# Patient Record
Sex: FEMALE | Race: WHITE | NOT HISPANIC OR LATINO | Employment: FULL TIME | URBAN - METROPOLITAN AREA
[De-identification: names, ages, dates, MRNs, and addresses within clinical notes are randomized per-mention and may not be internally consistent; named-entity substitution may affect disease eponyms.]

---

## 2023-05-12 ENCOUNTER — TELEPHONE (OUTPATIENT)
Dept: PLASTIC SURGERY | Facility: CLINIC | Age: 28
End: 2023-05-12
Payer: COMMERCIAL

## 2023-05-12 DIAGNOSIS — F64.9 GENDER DYSPHORIA: Primary | ICD-10-CM

## 2023-05-12 NOTE — TELEPHONE ENCOUNTER
Patient requesting a vaginoplasty revision.  Patient reports she had original vaginoplasty in 2020, with Dr. Marlin Castellanos.    Registered patient with chart in [facility].    Patient reports she was referred to Dr. Chowdary through her job.    Patient would like revision of vaginoplasty for removal of excess skin and to fix skin shelf at introitus.  Patient denies issues with dilating, but reports intercourse is not possible.  Patient reports she is interested in vaginal penetration.    Patient reports she has already tried pelvic PT.  She did it for 4 months and last had an appointment in March 2023.  Patient reports she saw no improvement with pelvic PT so she stopped going.    Patient reports she has been on HRT since 2016; living in gender role congruent with gender identity since 2016; out to family and friends since 2016; and height of 5’11 and weight of 180 lbs (BMI 25.1).    Notified patient that 2 mental health letters of readiness required before consult can be scheduled.  Patient reports they have a therapist and a psychiatrist who can each provide a CHRIS.  Emailed patient sample CHRIS per their request.  Asked patient to email me their 2 completed CHRSI when available.  Patient agreeable to plan.    Emailed patient new patient paperwork required to schedule consult.    Also emailed patient a flyer for the [facility] Gender Care Program.  Asked patient to outreach me if they need assistance getting connected to any of our gender-affirming resources.

## 2023-06-01 NOTE — TELEPHONE ENCOUNTER
Patient reports she faxed Dr. Castellanos's office the completed records release form so we should hopefully receive patient's medical records soon.

## 2023-06-01 NOTE — TELEPHONE ENCOUNTER
Received patient’s new patient paperwork and 2 CHRIS for vaginoplasty revision surgery, with Dr. Shakila Chowdary.  Spoke with patient and scheduled them for next available consult with Dr. Chowdary.  Patient satisfied.  Sent patient email with the office address for Dr. Chowdary, as well as their appointment date and time.  Also emailed patient records release form to request medical records from the office of Dr. Marlin Castellanos.

## 2023-06-28 NOTE — TELEPHONE ENCOUNTER
Have yet to receive patient's medical records from Dr. Castellanos's office.  Attempted to call office of Dr. Castellanos to request message -- left office detailed voice message requesting medical records.  Patient aware that Dr. Chowdary requires medical records from previous surgeon to move forward with revision.  Patient stated she would like to cancel consult until we receive her medical records.  Appointment canceled until we can obtain her records.

## 2023-08-11 RX ORDER — ATOMOXETINE 10 MG/1
60 CAPSULE ORAL DAILY
COMMUNITY

## 2023-08-11 NOTE — TELEPHONE ENCOUNTER
Received operative note from Dr. Castellanos's office.  Outreached patient and scheduled her for vaginoplasty revision consult.    Placed order for pelvic PT and advised patient to schedule appointment.  Patient agreeable.    Below is a copy of patient's upcoming consult note with Dr. Chowdary that has been pre-filled with patient history.  This is just in case patient's appointment is rescheduled or canceled, which could lead to the information being erased.        RECONSTRUCTIVE PLASTIC SURGERY  -  NEW PATIENT NOTE    CC: Evaluation for vaginoplasty revision    HPI: Vera Cote is a 28 y.o. transfeminine patient (assigned male at birth) who presents to discuss a vaginoplasty revision.  PMH significant for ADHD.    Patient's original vaginoplasty was performed on 10.21.2020 by Dr. Marlin Castellanos.  Patient would like revision of vaginoplasty for removal of excess skin and to fix skin shelf at introitus.  Patient denies issues with dilating, but reports intercourse is not possible.  Patient reports she is interested in vaginal penetration, however.  Patient reports she has already tried pelvic PT.  She did it for 4 months and last had an appointment in March 2023.  Patient reports she saw no improvement with pelvic PT so she stopped going.    Patient referred to [facility] pelvic PT.    Patient has been on HRT since 2016 and out socially since 2016.        Social History  Currently is currently working. Works as an  for gender affirming surgeries.  Lives with fiancee  Tobacco use: non-smoker  THC:  Yes, 1-2x/month via vape        ASSESSMENT / PLAN:     Vera Cote is a very pleasant 28 y.o. transfeminine patient (assigned male at birth) referred for vaginoplasty revision.

## 2023-08-11 NOTE — PROGRESS NOTES
"RECONSTRUCTIVE PLASTIC SURGERY  -  NEW PATIENT NOTE    CC: Evaluation for vaginoplasty revision    HPI: Vera Cote is a 28 y.o. transfeminine patient (assigned male at birth) who presents to discuss a vaginoplasty revision.  PMH significant for ADHD.      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 She had wound dehiscence early on and then required debridement of the labia. She was told that there would be a second stage for labiaplasty with her results.     2-3 months after surgery had bleeding with dilation and dilation become more difficult. Became painful and would take 1-2 hours to advance the dilator as far as possible. She lost depth and width after that, but was able to keep dilating some. She now dilates a couple of times a week. She reports being able to advance the dilator to the 3rd dot on the blue soul source dilator or to the 1st dot on the green dilator. She does not have pain with this. She states she is unable to have penetrative intercourse. It feels like the phallus is being blocked from further insertion, like it is \"hitting a wall.\" She has done pelvic PT in the past for approximately 4 months.     Clitoris sensation is good with orgasm   No urinary complications but unhappy with the aesethtics of the labia-urethral unit. States that it appears very pink and \"angry.\" She has had partners comment on the appearance in a negative way.       Patient has been on HRT since 2016 and out socially since 2016.        Past Medical History:   Diagnosis Date    ADHD     Wears glasses        Past Surgical History:   Procedure Laterality Date    VAGINOPLASTY  2020   Right lobectomy as child      Current Outpatient Medications:     atomoxetine (STRATTERA) 10 mg capsule, Take 10 mg by mouth 2 (two) times a day., Disp: , Rfl:     ESTRADIOL TRANSDERMAL PATCH TD, Place on the skin once a week. 3 times a week, Disp: , Rfl:      No Known Allergies    Social " History  Currently is currently working. Works as an  for gender affirming surgeries.  Lives with [facility]  Tobacco use: non-smoker  THC:  Yes, 1-2x/month via vape    Family History  History reviewed. No pertinent family history.       ROS: 10/14 systems reviewed, negative except noted above.       PHYSICAL EXAM:  There were no vitals filed for this visit.   Patient with height of 511 and weight of 180 lbs (BMI 25.1).      In general, the patient is well-appearing, nicely groomed, and in no acute distress. They are able to provide their own medical history.  Examination of the vulva reveals shortened left labia majora with fullness superiorly and loss of labia inferiorly. There is a small dog ear at the inferior end of the labia. The clitoris is midline and sensate. Urethra is midline. There is scattered pink granulation tissue along the uretha. Labia minora are effaced and flattened bilaterally. She has a prominent bulbospongiosus muscle at the base of the urethra. Introitus is supple. Speculum examination reveals narrowing of the vaginal canal after approx 2-3 inches proximal.         LABS/STUDIES: none pertinent        ASSESSMENT / PLAN:     Vera Cote is a very pleasant 28 y.o. transfeminine patient (assigned male at birth) referred for vaginoplasty revision. Discussed the different areas of concern and discussed revision for these areas. In regards to her labia, she has a loss of tissue at the inferior aspect of the labia. She would require adjacent tissue transfer, possible FTSG, to the area to reconstruct the inferior labia majora. In regards to the labia minora, she has effacement with little projection. Discussed secondary labiaplasty with tissue advancement and grafting. This will likely help some of the granulation tissue on the urethra as well, as this would prevent the urethra from being over exposed. In regards to her vaginal introitus, discussed trying pelvic PT to soften  scar and relax pelvic floor muscles. Her inability for penetration, but ability to dilate, may be due to the excess bulbospongiosus muscle around the urethra. When she is aroused, this likely becomes engorged and protrudes into the vaginal canal/introitus, making penetration difficult. She is not aroused with dilation, so she is able to advance the dilator further. Discussed removal of the excess tissue. We also discussed peritoneal pulldown revision if more depth is needed. We discussed staging procedures - would recommend removal of the bulbospongiosus muscle and labiaplasty. The removal of the excess muscle may be all she needs to allow for penetrative intercourse, as her canal does have a few inches of depth. If this does not address her penetration issues and more depth is needed, we can do a peritoneal pulldown to provide her more depth.     She will start with pelvic PT and we will re-engage afterward to further discuss surgery.             Photographs were taken in clinic today for surgical planning and continuity of care.       Thank you for the kind referral of this very pleasant patient. Please do not hesitate to contact me with any questions or concerns about my aspect of their care.       Shakila Chowdary MD

## 2023-09-22 ENCOUNTER — OFFICE VISIT (OUTPATIENT)
Dept: PLASTIC SURGERY | Facility: CLINIC | Age: 28
End: 2023-09-22
Payer: COMMERCIAL

## 2023-09-22 VITALS
SYSTOLIC BLOOD PRESSURE: 112 MMHG | HEIGHT: 71 IN | HEART RATE: 89 BPM | OXYGEN SATURATION: 99 % | BODY MASS INDEX: 27.86 KG/M2 | RESPIRATION RATE: 16 BRPM | WEIGHT: 199 LBS | DIASTOLIC BLOOD PRESSURE: 78 MMHG | TEMPERATURE: 98 F

## 2023-09-22 DIAGNOSIS — F64.9 GENDER DYSPHORIA: Primary | ICD-10-CM

## 2023-09-22 PROCEDURE — 3008F BODY MASS INDEX DOCD: CPT | Performed by: PLASTIC SURGERY

## 2023-09-22 PROCEDURE — 99203 OFFICE O/P NEW LOW 30 MIN: CPT | Performed by: PLASTIC SURGERY

## 2023-09-22 RX ORDER — TESTOSTERONE GEL, 1% 10 MG/G
GEL TRANSDERMAL
COMMUNITY
Start: 2022-09-28 | End: 2024-05-14 | Stop reason: ENTERED-IN-ERROR

## 2023-10-18 ENCOUNTER — HOSPITAL ENCOUNTER (OUTPATIENT)
Dept: PHYSICAL THERAPY | Facility: HOSPITAL | Age: 28
Setting detail: THERAPIES SERIES
Discharge: HOME | End: 2023-10-18
Attending: PLASTIC SURGERY
Payer: COMMERCIAL

## 2023-10-18 DIAGNOSIS — F64.9 GENDER DYSPHORIA: ICD-10-CM

## 2023-10-18 PROCEDURE — 97162 PT EVAL MOD COMPLEX 30 MIN: CPT | Mod: GP

## 2023-10-18 NOTE — OP PT TREATMENT LOG
"Vera is a 28 y.o F with c/o PFD. Hx vaginoplasty revision 2020 and surgeon left behind more BC tissue which limits penetration with sexual activity due to feeling \"wall\" sensation as well as increased pain. Noted urethra aims in different direction and may plan for revision surgery in the future. Physician instructed patient to trial PFPT first. Denies bowel or bladder issues or pelvic pain.        Bladder: Comment:         Urination frequency Every 2 hours   Urgency: denies   Incontinence denies   Pads denies   Nocturia denies   Pain denies   Emptying denies   Prolapse symptoms denies   Liquid consumption Water, coffee   UTI history denies     Bowel: Comment:         Frequency daily   Urge denies   Incontinence denies   Emptying denies   Pain denies   [place] stool Type 3 and 4   Fiber n/a   Management diet            Sexual Activity Comment:         Type PIV   Pain Yes with further insertion    Orgasm yes                 Pain Comment:         Mid spine pain 2/10; due to rib dysfunction                                     Other Comment:         Physical activity Biking, walking    PLOF 2020   Living environment Fiance  Surgical coordination at [place]    Other n/a                          Systems Review:  Cord questions:  Pins and needles or tingling in both arms and both legs at the same time? denies  Problems with stumbling or falling? Denies     Cauda equina questions:  Problems with bowel and bladder control? Specifically retention? See above  Pins and needles or numbness in the saddle area? Denies     Review of systems:  General - (chills, night sweats, recent infection, fever, weight loss/gain, unexplained night pain, excessive fatigue): Denies  Do you have a history of cancer? Denies  Gastrointestinal system - (abdominal pain, bowel changes, nausea, vomiting, bloating): Denies  Cardiovascular system - (chest pain, palpitations, orthopnea, other): Denies  Respiratory system - (cough, SOB, sputum production, " other): Denies  Musculoskeletal system: osteoporosis, vertebral fracture? Denies  Endocrine - (polyuria, polydipsia, heat or cold intolerance, other): Denies  Neurological - (numbness/tingling, falling/stumbling, HA, dizziness, diplopia, dysphagia/dysarthria, double vision, tinnitus, memory, drop attacks, other): Denies  Any long-term steroid use? Denies        Patient goals prep     OBJECTIVE FINDINGS:       ASSESSMENT   PELVIS/POSTURE                 LUMBAR AROM     Flexion  50%   Extension  100%   rotation  100%   Sidebending  100%         HIP ROM     ER  WFL   IR  WFL   Flexion  WFL   Extension  NT   Knee ROM WFL   Ankle ROM  WFL   Lower Extremity Strength     Hip flexion  5/5   Hip extension  NT   Hip IR  5/5   Hip ER  5/5   Hip abduction  5/5   Hip adduction 5/5   Knee Flexion  5/5   Knee Extension 5/5   Ankle DF  5/5               SPECIAL TESTS     Hamstring length  neg   Hip FADIR  neg   Hip SCOUR  neg   Trendelenburg     Slump test     SLR test     JUSTIN  neg               LUMBAR PALPATION     HIP PALPATION     OTHER PALPATION           SI JOINT TESTING     S/L Compression test     Supine Gapping test    Thigh thrust test     Prone sacral thrust test     Gaenslen test           ABDOMEN     Palpation  no TTP or restriction   Hip flexors Mild tension B   Diaphragm no TTP or restriction   Breathing pattern  belly   Diastasis  NT   Skin/ integumentary  intact         BALANCE     GAIT MECHANICS     RUNNING MECHANICS     SQUATTING MECHANICS     LUNGING MECHANICS     BED MOBILITY     SIT TO STAND     FLOOR TO STAND        Verbal consent give for internal evaluation and treatment. YES       ASSESSMENT   EXTERNAL genital EXAM     PFM OBSERVATION     Skin integrity  intact; redness noted over labia   contraction  present   relaxation  normal   Bulge/pelvic drop  present   Cough  normal   Light touch sensation  intact   INTERNAL PFM exam Internal vaginal   Levator Ani Strength  *deferred due to pelvic tension   Power     Endurance     Repetitions     Quick Flicks                 Palpation  mild tension first later  Mild to moderate tension deep layer  May be due to scarring  No pain              TAILBONE               TREATMENT LOG:  verbal consent for internal intervention: YES  Diagnosis   PFD     Precautions          PT INITIAL EVALUATION:    10/18/23      PT PROGRESS NOTE:           Y/N Treatment Details: Time:   MODALITIES  41717     0 mins   Heat         Ice         THER ACT          52267     0 mins (billed with eval)   OUTCOME MEASURES Y PFDI: 55/300 = 18.3%     Falls Screen, Medication Review, VS, pain assessment Y       Pt Education:verbalized understanding of education and returned demonstration appropriately Y Pelvic floor anatomy/ purpose function, POC, relationship between PFM/diaphragm     HEP / HEP Review Y  eval: happy baby, butterfly, child's pose     THER EX         30055     0 mins                                                     NEURO RE-ED    51844     0 mins                                 MANUAL                 58434     0 mins   Joint Mobilization          Soft Tissue mobilizations External         Soft Tissue Mobilization Internal         IASTM

## 2023-10-18 NOTE — PROGRESS NOTES
Physical Therapy Evaluation    [facility] Fax: [phone]    PT EVALUATION FOR OUTPATIENT THERAPY    Patient: Vera Cote    MRN: [id number]  : 1995 28 y.o.     Referring Physician: Shakila Chowdary MD  Date of Visit: 10/18/2023      Certification Dates:  10/18/23 through 24         Recommended Frequency & Duration:  1 time/week for up to 3 months     Diagnosis:   1. Gender dysphoria        Chief Complaints:   Chief Complaint   Patient presents with    PFD       Precautions: no known precautions/restrictions  Precautions additional comments:      Past Medical History:   Past Medical History:   Diagnosis Date    ADHD     Wears glasses        Past Surgical History:   Past Surgical History:   Procedure Laterality Date    VAGINOPLASTY           LEARNING ASSESSMENT    Assessment completed:  Yes    Learner name:  Vera    Learner: Patient    Learning Barriers:  Learning barriers: No Barriers    Preferred Language: English     Needed: No    Education Provided:   Method: Discussion, Handout and Demonstration  Readiness: acceptance and eager  Response: Demonstrated understanding, Needs reinforcement and Verbalizes understanding      CO-LEARNER ASSESSMENT:    Completed: No          Welcome letter discussed: Yes Patient provided with Welcome Letter, which includes attendance policy. Provided education regarding cancellation and no-show policy. Education regarding the importance of participation and regular attendance to maximize goal attainment.         OBJECTIVE MEASUREMENTS/DATA:    Time In Session:  Start Time: 1100  Stop Time: 1200  Time Calculation (min): 60 min   Assessment and Plan - 10/18/23 1446        Assessment    Plan of Care reviewed and patient/family in agreement Yes     System Pathology/Pathophysiology Noted genitourinary     Functional Limitations in Following Categories (PT Eval) community/leisure;home management;work     Rehab Potential/Prognosis good, to  achieve stated therapy goals     Problem List --   PFD    Clinical Assessment Vera is a 28 y.o transF who presents to PFPT with complaints of PFD. Demonstrated limited lumbar flexion AROM, but otherwise WFL. Displayed BLE ROM WFL and BLE strength 5/5. Tested negative for special hip tests and abdomen assessment revealed up to mild tension B hip flexors. Internal assessment revealed up to moderate tension, but no pain reports. Tension reduced with DB and sustained pressures. Educated patient on findings and relationship between diaphragm/PFM. Provided HEP for PFM lengthening. No questions or concerns post.     Plan and Recommendations internal, MT B hips, PFM length     Planned Services CPT 98474 Gait training;CPT 92331 Manual therapy;CPT 40698 Neuromuscular Reeducation;CPT 56975 Therapeutic activities;CPT 29040 Therapeutic exercises;CPT 35845 Therapeutic Massage;CPT 04760 Electrical stimulation UNATTENDED;CPT 87753 Hot/Cold Packs therapy                General Information - 10/18/23 1446        Session Details    Document Type initial evaluation     Mode of Treatment individual therapy        General Information    Referring Physician Shakila Chowdary MD     Existing Precautions/Restrictions no known precautions/restrictions                Pain/Vitals - 10/18/23 1242        Pain Assessment    Currently in pain Yes     Preferred Pain Scale number (Numeric Rating Pain Scale)     Pain Side/Orientation upper     Pain: Body location Back     Pain Rating (0-10): Pre Activity 2     Pain Rating (0-10): Activity 2        Pain Intervention    Intervention  IE     Post Intervention Comments IE                Falls/Food Screening - 10/18/23 1100        Initial Falls Assessment    One or more falls in the last year No        Food Insecurity    Within the past 12 months, you worried that your food would run out before you got the money to buy more. Never true     Within the past 12 months, the food you bought just didn't last  "and you didn't have money to get more. Never true                PT - 10/18/23 1446        Physical Therapy    Physical Therapy Specialty Pelvic Floor Program: Age 14+        PT Plan    Frequency of treatment 1 time/week     PT Duration 3 months     PT Cert From 10/18/23     PT Cert To 01/16/24     Date PT POC was sent to provider 10/18/23     Signed PT Plan of Care received?  No                      Goals       <enter goal here> (pt-stated)       Reduce PFM tension        Mutually agreed upon pain goal       Mutually agreed upon pain goal: denies pelvic pain        Other Goal       Goals:    In 6 weeks, patient will:      (I) with HEP in order to maintain and aide in gains made with skilled PT services  (I) with toilet posture and bowel management in order to improve bowel function.  Reduced TTP and fascial restrictions in abdomen by 50% in order to improve pelvic floor function and QOL.  Reduce PFM tension by 50% in order to normalize PFM function    In 12 weeks patient will:    Improve score on PFDI to <10% in order to improve QOL.     Be (I) with pelvic floor contraction/ relaxation/ bearing down in order to improve normal bowel and bladder function.    Reduced TTP and fascial restrictions in abdomen by 90% in order to improve pelvic floor function and QOL.    Reduce PFM tension by 90% in order to normalize PFM function                     TREATMENT PLAN:    Vera is a 28 y.o F with c/o PFD. Hx vaginoplasty revision 2020 and surgeon left behind more BC tissue which limits penetration with sexual activity due to feeling \"wall\" sensation as well as increased pain. Noted urethra aims in different direction and may plan for revision surgery in the future. Physician instructed patient to trial PFPT first. Denies bowel or bladder issues or pelvic pain.        Bladder: Comment:         Urination frequency Every 2 hours   Urgency: denies   Incontinence denies   Pads denies   Nocturia denies   Pain denies   Emptying " denies   Prolapse symptoms denies   Liquid consumption Water, coffee   UTI history denies     Bowel: Comment:         Frequency daily   Urge denies   Incontinence denies   Emptying denies   Pain denies   [place] stool Type 3 and 4   Fiber n/a   Management diet            Sexual Activity Comment:         Type PIV   Pain Yes with further insertion    Orgasm yes                 Pain Comment:         Mid spine pain 2/10; due to rib dysfunction                                     Other Comment:         Physical activity Biking, walking    PLOF 2020   Living environment [facility]  Surgical coordination at [facility]    Other n/a                          Systems Review:  Cord questions:  Pins and needles or tingling in both arms and both legs at the same time? denies  Problems with stumbling or falling? Denies     Cauda equina questions:  Problems with bowel and bladder control? Specifically retention? See above  Pins and needles or numbness in the saddle area? Denies     Review of systems:  General - (chills, night sweats, recent infection, fever, weight loss/gain, unexplained night pain, excessive fatigue): Denies  Do you have a history of cancer? Denies  Gastrointestinal system - (abdominal pain, bowel changes, nausea, vomiting, bloating): Denies  Cardiovascular system - (chest pain, palpitations, orthopnea, other): Denies  Respiratory system - (cough, SOB, sputum production, other): Denies  Musculoskeletal system: osteoporosis, vertebral fracture? Denies  Endocrine - (polyuria, polydipsia, heat or cold intolerance, other): Denies  Neurological - (numbness/tingling, falling/stumbling, HA, dizziness, diplopia, dysphagia/dysarthria, double vision, tinnitus, memory, drop attacks, other): Denies  Any long-term steroid use? Denies        Patient goals prep     OBJECTIVE FINDINGS:       ASSESSMENT   PELVIS/POSTURE                 LUMBAR AROM     Flexion  50%   Extension  100%   rotation  100%   Sidebending  100%         HIP ROM      ER  WFL   IR  WFL   Flexion  WFL   Extension  NT   Knee ROM WFL   Ankle ROM  WFL   Lower Extremity Strength     Hip flexion  5/5   Hip extension  NT   Hip IR  5/5   Hip ER  5/5   Hip abduction  5/5   Hip adduction 5/5   Knee Flexion  5/5   Knee Extension 5/5   Ankle DF  5/5               SPECIAL TESTS     Hamstring length  neg   Hip FADIR  neg   Hip SCOUR  neg   Trendelenburg     Slump test     SLR test     JUSTIN  neg               LUMBAR PALPATION     HIP PALPATION     OTHER PALPATION           SI JOINT TESTING     S/L Compression test     Supine Gapping test    Thigh thrust test     Prone sacral thrust test     Gaenslen test           ABDOMEN     Palpation  no TTP or restriction   Hip flexors Mild tension B   Diaphragm no TTP or restriction   Breathing pattern  belly   Diastasis  NT   Skin/ integumentary  intact         BALANCE     GAIT MECHANICS     RUNNING MECHANICS     SQUATTING MECHANICS     LUNGING MECHANICS     BED MOBILITY     SIT TO STAND     FLOOR TO STAND        Verbal consent give for internal evaluation and treatment. YES       ASSESSMENT   EXTERNAL genital EXAM     PFM OBSERVATION     Skin integrity  intact; redness noted over labia   contraction  present   relaxation  normal   Bulge/pelvic drop  present   Cough  normal   Light touch sensation  intact   INTERNAL PFM exam Internal vaginal   Levator Ani Strength  *deferred due to pelvic tension   Power    Endurance     Repetitions     Quick Flicks                 Palpation  mild tension first later  Mild to moderate tension deep layer  May be due to scarring  No pain              TAILBONE               TREATMENT LOG:  verbal consent for internal intervention: YES  Diagnosis   PFD     Precautions          PT INITIAL EVALUATION:    10/18/23      PT PROGRESS NOTE:           Y/N Treatment Details: Time:   MODALITIES  96833     0 mins   Heat         Ice         THER ACT          02095     0 mins (billed with eval)   OUTCOME MEASURES Y PFDI: 55/300 = 18.3%      Falls Screen, Medication Review, VS, pain assessment Y       Pt Education:verbalized understanding of education and returned demonstration appropriately Y Pelvic floor anatomy/ purpose function, POC, relationship between PFM/diaphragm     HEP / HEP Review Y  eval: happy baby, butterfly, child's pose     THER EX         49231     0 mins                                                     NEURO RE-ED    79738     0 mins                                 MANUAL                 94228     0 mins   Joint Mobilization          Soft Tissue mobilizations External         Soft Tissue Mobilization Internal         IASTM                           ASSESSMENT:    This 28 y.o. year old adult presents to PT with above stated diagnosis. Physical Therapy evaluation reveals  (PFD) resulting in community/leisure, home management, work limitations. Vera Cote will benefit from skilled PT services to address limitation, work towards rehab and patient goals and maximize PLOF of chosen ADLs.     Planned Services: The patient's treatment will include CPT 04925 Gait training, CPT 12025 Manual therapy, CPT 71216 Neuromuscular Reeducation, CPT 62389 Therapeutic activities, CPT 35546 Therapeutic exercises, CPT 79376 Therapeutic Massage, CPT 41817 Electrical stimulation UNATTENDED, CPT 72143 Hot/Cold Packs therapy, .     Norberto Yang, PT

## 2023-10-27 ENCOUNTER — OFFICE VISIT (OUTPATIENT)
Dept: PLASTIC SURGERY | Facility: CLINIC | Age: 28
End: 2023-10-27
Payer: COMMERCIAL

## 2023-10-27 DIAGNOSIS — Q64.9 URINARY ANOMALY: ICD-10-CM

## 2023-10-27 DIAGNOSIS — F64.9 GENDER DYSPHORIA: Primary | ICD-10-CM

## 2023-10-27 PROCEDURE — 99213 OFFICE O/P EST LOW 20 MIN: CPT | Performed by: PLASTIC SURGERY

## 2023-10-27 ASSESSMENT — PAIN SCALES - GENERAL: PAINLEVEL: 0-NO PAIN

## 2023-10-30 NOTE — PROGRESS NOTES
Plastic Surgery Clinic Note    Vera presents for follow-up today regarding revision urethroplasty and labioplasty.  She has given her last conversation and a lot of thought based on our clinical findings.  She does think erogenous tissue is contributing to her difficulty with penetration.  She recently saw pelvic floor PT, but stated that she felt like there was no significant treatment plan in place as there were not many abnormalities noted, except height hip flexors.  She did her own experiment, however, where she attempted penetration without being aroused and reports that this went significantly better with penetration being possible to full depth.     We discussed plan for revision urethroplasty to remove the extra erectile tissue from around the urethra.  We discussed that this may help remove some of the extra tissue that could be blocking the introitus.  We also discussed a V to Y advancement at the inferior urethra in order to change the diverted urinary stream, which currently points anteriorly, so that it is straight.  We also discussed plans for revision labioplasty.  We discussed that we could attempt these in 1 procedure, but it may be necessary to do it staged depending on skin vascularity and viability.  If we do perform the procedure stage, we would perform the urethroplasty first followed by the labioplasty second.  We discussed that urethroplasty would require a Pena catheter for a few days, or longer if there was a urethral injury.  If the labioplasty is done with skin grafts, she may require a few days in the hospital admitted on bedrest to minimize any shear of the grafts.  She understands that risks of this procedure include, but are not limited to: Bleeding, infection, urethral injury, diverted urinary stream, skin necrosis, graft loss, change in sensation, change in function, anorgasmia, and need for further surgery.  As she was able to successfully have penetration when not aroused, we  discussed holding off on any deepening procedures at this time, as it seems that her problems may be more related to the extra erogenous tissue.  If after removal of the erogenous tissue, she still has issues with penetration, we can further discuss revision vaginoplasty with peritoneal pulldown approach.  She understands and would like to proceed with scheduling surgery.    I spent 25 minutes with this patient and greater than 50% of the time was spent in counseling regarding revision urethroplasty and labiaplasty and in coordination of care.          Shakila Chowdary MD

## 2023-11-14 NOTE — PROGRESS NOTES
* 23: patient did not return to PT services since IE and has no follow-up appointments scheduled. Per policy, patient will be D/C at this time.     Physical Therapy Discharge      PT DISCHARGE NOTE FOR OUTPATIENT THERAPY    Patient: Vera Cote MRN: [id number]  : 1995 28 y.o.  Referring Physician: No ref. provider found  Date of Visit: 2023            OBJECTIVE MEASUREMENTS/DATA:    *unable to assess objective measures, as patient did not return to PT services        Goals Addressed                    This Visit's Progress      COMPLETED: <enter goal here> (pt-stated)         Reduce PFM tension  *unable to assess goals, as patient did not return to PT services         COMPLETED: Mutually agreed upon pain goal         Mutually agreed upon pain goal: denies pelvic pain  *unable to assess goals, as patient did not return to PT services         COMPLETED: Other Goal         *unable to assess goals, as patient did not return to PT services       Goals:    In 6 weeks, patient will:      (I) with HEP in order to maintain and aide in gains made with skilled PT services  (I) with toilet posture and bowel management in order to improve bowel function.  Reduced TTP and fascial restrictions in abdomen by 50% in order to improve pelvic floor function and QOL.  Reduce PFM tension by 50% in order to normalize PFM function    In 12 weeks patient will:    Improve score on PFDI to <10% in order to improve QOL.     Be (I) with pelvic floor contraction/ relaxation/ bearing down in order to improve normal bowel and bladder function.    Reduced TTP and fascial restrictions in abdomen by 90% in order to improve pelvic floor function and QOL.    Reduce PFM tension by 90% in order to normalize PFM function

## 2023-12-01 ENCOUNTER — APPOINTMENT (RX ONLY)
Dept: URBAN - METROPOLITAN AREA CLINIC 28 | Facility: CLINIC | Age: 28
Setting detail: DERMATOLOGY
End: 2023-12-01

## 2023-12-01 DIAGNOSIS — L70.0 ACNE VULGARIS: ICD-10-CM | Status: INADEQUATELY CONTROLLED

## 2023-12-01 DIAGNOSIS — D22 MELANOCYTIC NEVI: ICD-10-CM

## 2023-12-01 DIAGNOSIS — D18.0 HEMANGIOMA: ICD-10-CM

## 2023-12-01 DIAGNOSIS — L90.5 SCAR CONDITIONS AND FIBROSIS OF SKIN: ICD-10-CM

## 2023-12-01 DIAGNOSIS — Z71.89 OTHER SPECIFIED COUNSELING: ICD-10-CM

## 2023-12-01 DIAGNOSIS — L81.4 OTHER MELANIN HYPERPIGMENTATION: ICD-10-CM

## 2023-12-01 PROBLEM — D18.01 HEMANGIOMA OF SKIN AND SUBCUTANEOUS TISSUE: Status: ACTIVE | Noted: 2023-12-01

## 2023-12-01 PROBLEM — D22.5 MELANOCYTIC NEVI OF TRUNK: Status: ACTIVE | Noted: 2023-12-01

## 2023-12-01 PROCEDURE — ? COUNSELING

## 2023-12-01 PROCEDURE — ? TREATMENT REGIMEN

## 2023-12-01 PROCEDURE — ? PRESCRIPTION MEDICATION MANAGEMENT

## 2023-12-01 PROCEDURE — ? SUNSCREEN RECOMMENDATIONS

## 2023-12-01 PROCEDURE — 11301 SHAVE SKIN LESION 0.6-1.0 CM: CPT

## 2023-12-01 PROCEDURE — ? PRESCRIPTION

## 2023-12-01 PROCEDURE — ? SHAVE REMOVAL

## 2023-12-01 PROCEDURE — 99204 OFFICE O/P NEW MOD 45 MIN: CPT | Mod: 25

## 2023-12-01 RX ORDER — TRETIONIN 0.25 MG/G
CREAM TOPICAL QHS
Qty: 45 | Refills: 3 | Status: ERX | COMMUNITY
Start: 2023-12-01

## 2023-12-01 RX ADMIN — TRETIONIN: 0.25 CREAM TOPICAL at 00:00

## 2023-12-01 ASSESSMENT — LOCATION SIMPLE DESCRIPTION DERM
LOCATION SIMPLE: LEFT FOREARM
LOCATION SIMPLE: LEFT CHEEK
LOCATION SIMPLE: CHEST
LOCATION SIMPLE: LEFT UPPER BACK
LOCATION SIMPLE: UPPER BACK

## 2023-12-01 ASSESSMENT — LOCATION ZONE DERM
LOCATION ZONE: FACE
LOCATION ZONE: TRUNK
LOCATION ZONE: TRUNK
LOCATION ZONE: ARM

## 2023-12-01 ASSESSMENT — LOCATION DETAILED DESCRIPTION DERM
LOCATION DETAILED: LEFT MID-UPPER BACK
LOCATION DETAILED: RIGHT MEDIAL SUPERIOR CHEST
LOCATION DETAILED: SUPERIOR THORACIC SPINE
LOCATION DETAILED: LEFT PROXIMAL DORSAL FOREARM
LOCATION DETAILED: LEFT CENTRAL MALAR CHEEK

## 2023-12-01 NOTE — PROCEDURE: TREATMENT REGIMEN
Initiate Treatment: I counseled the patient regarding the following:\\nInstructions: Monthly self-skin checks to monitor for any changes in moles are recommended.\\n\\nExpectations: Benign Nevi are pigmented nests of cells within the skin. No treatment is necessary.\\nContact Office if: Any moles change in size, shape or color; itch, burn or bleed.\\n\\nPatient was reassured that these benign nevi do not appear concerning, and to alert me if any changes (e.g. asymmetry, irregular borders, change in color, size, etc.) are observed.
Detail Level: Zone
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

## 2023-12-01 NOTE — PROCEDURE: PRESCRIPTION MEDICATION MANAGEMENT
Yes
Render In Strict Bullet Format?: No
Initiate Treatment: tretinoin 0.025 % topical cream QHS Apply a pea sized amount to face at bedtime - may combine with non-comedogenic moisturizers.
Detail Level: Zone

## 2023-12-01 NOTE — PROCEDURE: COUNSELING
Detail Level: Generalized
Detail Level: Detailed
Azelaic Acid Pregnancy And Lactation Text: This medication is considered safe during pregnancy and breast feeding.
Topical Clindamycin Counseling: Patient counseled that this medication may cause skin irritation or allergic reactions.  In the event of skin irritation, the patient was advised to reduce the amount of the drug applied or use it less frequently.   The patient verbalized understanding of the proper use and possible adverse effects of clindamycin.  All of the patient's questions and concerns were addressed.
Dapsone Counseling: I discussed with the patient the risks of dapsone including but not limited to hemolytic anemia, agranulocytosis, rashes, methemoglobinemia, kidney failure, peripheral neuropathy, headaches, GI upset, and liver toxicity.  Patients who start dapsone require monitoring including baseline LFTs and weekly CBCs for the first month, then every month thereafter.  The patient verbalized understanding of the proper use and possible adverse effects of dapsone.  All of the patient's questions and concerns were addressed.
Isotretinoin Pregnancy And Lactation Text: This medication is Pregnancy Category X and is considered extremely dangerous during pregnancy. It is unknown if it is excreted in breast milk.
Spironolactone Counseling: Patient advised regarding risks of diarrhea, abdominal pain, hyperkalemia, birth defects (for female patients), liver toxicity and renal toxicity. The patient may need blood work to monitor liver and kidney function and potassium levels while on therapy. The patient verbalized understanding of the proper use and possible adverse effects of spironolactone.  All of the patient's questions and concerns were addressed.
Birth Control Pills Counseling: Birth Control Pill Counseling: I discussed with the patient the potential side effects of OCPs including but not limited to increased risk of stroke, heart attack, thrombophlebitis, deep venous thrombosis, hepatic adenomas, breast changes, GI upset, headaches, and depression.  The patient verbalized understanding of the proper use and possible adverse effects of OCPs. All of the patient's questions and concerns were addressed.
Erythromycin Pregnancy And Lactation Text: This medication is Pregnancy Category B and is considered safe during pregnancy. It is also excreted in breast milk.
Topical Sulfur Applications Counseling: Topical Sulfur Counseling: Patient counseled that this medication may cause skin irritation or allergic reactions.  In the event of skin irritation, the patient was advised to reduce the amount of the drug applied or use it less frequently.   The patient verbalized understanding of the proper use and possible adverse effects of topical sulfur application.  All of the patient's questions and concerns were addressed.
Bactrim Counseling:  I discussed with the patient the risks of sulfa antibiotics including but not limited to GI upset, allergic reaction, drug rash, diarrhea, dizziness, photosensitivity, and yeast infections.  Rarely, more serious reactions can occur including but not limited to aplastic anemia, agranulocytosis, methemoglobinemia, blood dyscrasias, liver or kidney failure, lung infiltrates or desquamative/blistering drug rashes.
Minocycline Counseling: Patient advised regarding possible photosensitivity and discoloration of the teeth, skin, lips, tongue and gums.  Patient instructed to avoid sunlight, if possible.  When exposed to sunlight, patients should wear protective clothing, sunglasses, and sunscreen.  The patient was instructed to call the office immediately if the following severe adverse effects occur:  hearing changes, easy bruising/bleeding, severe headache, or vision changes.  The patient verbalized understanding of the proper use and possible adverse effects of minocycline.  All of the patient's questions and concerns were addressed.
Doxycycline Pregnancy And Lactation Text: This medication is Pregnancy Category D and not consider safe during pregnancy. It is also excreted in breast milk but is considered safe for shorter treatment courses.
Tetracycline Pregnancy And Lactation Text: This medication is Pregnancy Category D and not consider safe during pregnancy. It is also excreted in breast milk.
Topical Retinoid counseling:  Patient advised to apply a pea-sized amount only at bedtime and wait 30 minutes after washing their face before applying.  If too drying, patient may add a non-comedogenic moisturizer. The patient verbalized understanding of the proper use and possible adverse effects of retinoids.  All of the patient's questions and concerns were addressed.
Topical Sulfur Applications Pregnancy And Lactation Text: This medication is Pregnancy Category C and has an unknown safety profile during pregnancy. It is unknown if this topical medication is excreted in breast milk.
Sarecycline Counseling: Patient advised regarding possible photosensitivity and discoloration of the teeth, skin, lips, tongue and gums.  Patient instructed to avoid sunlight, if possible.  When exposed to sunlight, patients should wear protective clothing, sunglasses, and sunscreen.  The patient was instructed to call the office immediately if the following severe adverse effects occur:  hearing changes, easy bruising/bleeding, severe headache, or vision changes.  The patient verbalized understanding of the proper use and possible adverse effects of sarecycline.  All of the patient's questions and concerns were addressed.
Aklief Pregnancy And Lactation Text: It is unknown if this medication is safe to use during pregnancy.  It is unknown if this medication is excreted in breast milk.  Breastfeeding women should use the topical cream on the smallest area of the skin for the shortest time needed while breastfeeding.  Do not apply to nipple and areola.
Use Enhanced Medication Counseling?: No
Winlevi Pregnancy And Lactation Text: This medication is considered safe during pregnancy and breastfeeding.
Tazorac Counseling:  Patient advised that medication is irritating and drying.  Patient may need to apply sparingly and wash off after an hour before eventually leaving it on overnight.  The patient verbalized understanding of the proper use and possible adverse effects of tazorac.  All of the patient's questions and concerns were addressed.
Tazorac Pregnancy And Lactation Text: This medication is not safe during pregnancy. It is unknown if this medication is excreted in breast milk.
Birth Control Pills Pregnancy And Lactation Text: This medication should be avoided if pregnant and for the first 30 days post-partum.
Isotretinoin Counseling: Patient should get monthly blood tests, not donate blood, not drive at night if vision affected, not share medication, and not undergo elective surgery for 6 months after tx completed. Side effects reviewed, pt to contact office should one occur.
Azelaic Acid Counseling: Patient counseled that medicine may cause skin irritation and to avoid applying near the eyes.  In the event of skin irritation, the patient was advised to reduce the amount of the drug applied or use it less frequently.   The patient verbalized understanding of the proper use and possible adverse effects of azelaic acid.  All of the patient's questions and concerns were addressed.
High Dose Vitamin A Counseling: Side effects reviewed, pt to contact office should one occur.
Dapsone Pregnancy And Lactation Text: This medication is Pregnancy Category C and is not considered safe during pregnancy or breast feeding.
Spironolactone Pregnancy And Lactation Text: This medication can cause feminization of the male fetus and should be avoided during pregnancy. The active metabolite is also found in breast milk.
Topical Clindamycin Pregnancy And Lactation Text: This medication is Pregnancy Category B and is considered safe during pregnancy. It is unknown if it is excreted in breast milk.
Benzoyl Peroxide Counseling: Patient counseled that medicine may cause skin irritation and bleach clothing.  In the event of skin irritation, the patient was advised to reduce the amount of the drug applied or use it less frequently.   The patient verbalized understanding of the proper use and possible adverse effects of benzoyl peroxide.  All of the patient's questions and concerns were addressed.
Azithromycin Counseling:  I discussed with the patient the risks of azithromycin including but not limited to GI upset, allergic reaction, drug rash, diarrhea, and yeast infections.
Topical Retinoid Pregnancy And Lactation Text: This medication is Pregnancy Category C. It is unknown if this medication is excreted in breast milk.
Winlevi Counseling:  I discussed with the patient the risks of topical clascoterone including but not limited to erythema, scaling, itching, and stinging. Patient voiced their understanding.
Aklief counseling:  Patient advised to apply a pea-sized amount only at bedtime and wait 30 minutes after washing their face before applying.  If too drying, patient may add a non-comedogenic moisturizer.  The most commonly reported side effects including irritation, redness, scaling, dryness, stinging, burning, itching, and increased risk of sunburn.  The patient verbalized understanding of the proper use and possible adverse effects of retinoids.  All of the patient's questions and concerns were addressed.
Doxycycline Counseling:  Patient counseled regarding possible photosensitivity and increased risk for sunburn.  Patient instructed to avoid sunlight, if possible.  When exposed to sunlight, patients should wear protective clothing, sunglasses, and sunscreen.  The patient was instructed to call the office immediately if the following severe adverse effects occur:  hearing changes, easy bruising/bleeding, severe headache, or vision changes.  The patient verbalized understanding of the proper use and possible adverse effects of doxycycline.  All of the patient's questions and concerns were addressed.
Benzoyl Peroxide Pregnancy And Lactation Text: This medication is Pregnancy Category C. It is unknown if benzoyl peroxide is excreted in breast milk.
Azithromycin Pregnancy And Lactation Text: This medication is considered safe during pregnancy and is also secreted in breast milk.
High Dose Vitamin A Pregnancy And Lactation Text: High dose vitamin A therapy is contraindicated during pregnancy and breast feeding.
Tetracycline Counseling: Patient counseled regarding possible photosensitivity and increased risk for sunburn.  Patient instructed to avoid sunlight, if possible.  When exposed to sunlight, patients should wear protective clothing, sunglasses, and sunscreen.  The patient was instructed to call the office immediately if the following severe adverse effects occur:  hearing changes, easy bruising/bleeding, severe headache, or vision changes.  The patient verbalized understanding of the proper use and possible adverse effects of tetracycline.  All of the patient's questions and concerns were addressed. Patient understands to avoid pregnancy while on therapy due to potential birth defects.
Erythromycin Counseling:  I discussed with the patient the risks of erythromycin including but not limited to GI upset, allergic reaction, drug rash, diarrhea, increase in liver enzymes, and yeast infections.
Bactrim Pregnancy And Lactation Text: This medication is Pregnancy Category D and is known to cause fetal risk.  It is also excreted in breast milk.

## 2023-12-06 ENCOUNTER — TELEPHONE (OUTPATIENT)
Dept: PLASTIC SURGERY | Facility: CLINIC | Age: 28
End: 2023-12-06
Payer: COMMERCIAL

## 2023-12-06 NOTE — TELEPHONE ENCOUNTER
Spoke with patient and answered all of her questions in regards to preparing for vaginoplasty revision surgery.

## 2024-04-17 NOTE — PROGRESS NOTES
Plastic Surgery H&P      HPI: Vera presents today for preoperative evaluation prior to surgery. She is in her usual state of health. No recent illnesses. Deny fever, chills, CP, palpitations, SOB, abdominal discomfort, and changes in urine or bowel habits.     As discussed, you will need to do a rapid at home COVID test and send the results to our clinic, this should be done within 5 days of surgery.     Discussed stopping THC two weeks before and after surgery.       Physical Examination:  In general, patient is healthy appearing and in no acute distress.   Cardiac exam reveals regular rate and rhythm  Lungs are clear to auscultation bilaterally      A/P: Patient is scheduled for revision urethroplasty and possible labioplasty, possible skin graft, possible VY advancement flap. Plan to proceed with surgery as planned. Today we rediscussed the surgical procedure, risks of surgery, and post operative process. Risks discussed included, but were not limited to: bleeding, seroma, infection, delayed wound healing, unfavorable scarring, asymmetry, contour irregularity, urethral injury, riojas catheter placement, diverted urinary stream, skin necrosis, graft loss, change in sensation, change in function, anorgasmia, damage to adjacent structures, and need for future surgeries.     We discussed preoperative instructions and reviewed medications, discussing which ones to hold. We discussed the purchase of hibiclens soap and washing protocol the night before and the morning of surgery.     Post operative instructions were reviewed. Post operative medications were prescribed to the pharmacy of choice. These can be collected prior to surgery.     Discussed risks of opioid use with patient: risk of addiction and overdose, increased risk of addiction to controlled substances in individuals suffering from mental or substance use disorders, and dangers of taking a controlled substance containing an opioid with benzodiazepines,  alcohol, or other central nervous system depressants. Asked to purchase Narcan as they are being prescribed an opioid.      The [facility] is dedicated to providing high quality, patient centered gender affirming care. While regret is rare (fewer than 1% of patients experience surgical regret in long term studies), there are instances in which some individuals may regret their decision to undergo gender affirmation surgery. Regret may manifest in different ways, including change in social relationships, change in life goals or sexuality, surgical complications, or dissatisfaction with a surgical result. It is important that every patient's decision is informed by the risks and benefits, as well as clear expectations, which we reviewed today.         PRE-OPERATIVE INSTRUCTIONS   - Nothing to eat or drink after midnight the night before surgery  - purchase Hibiclens (chlorhexidine soap) from pharmacy and wash body (except face and genitalia) the night before and morning of.   -  prescribed medications and Narcan prior to surgery  - Please send rapid at home COVID test result to our clinic within 5 days of surgery      SIX WEEKS BEFORE AND AFTER SURGERY:   You will need to refrain from all nicotine products, including cigarettes, pipe tobacco, chew or “the patch.” Nicotine interferes with healthy circulation and may affect the result of your surgery. It also places you at higher risk of complications when receiving anesthesia.   If your nicotine test comes back positive, your surgery will be cancelled.       THREE WEEKS BEFORE SURGERY:   Based on your individualized assessment, we may request for laboratory tests and/or EKG. If you are having testing at a lab other than the one we suggested, you are responsible for having the hard copy results arrive at our office one full week before surgery (our fax number is [phone]).   A mammogram done within the past year is required for any  sex assigned at birth female at or over the age of 35 for any type of chest surgery.   SURGERY WILL NEED TO BE CANCELLED IF THERE IS ANY CHANCE THAT YOU ARE PREGNANT.      TWO WEEKS BEFORE SURGERY:   We ask that you do not take any products that “thin the blood” in order to minimize bleeding during and after surgery. For example, avoid products containing aspirin, ibuprofen (Advil, Motrin, Aleve), non-steroidal anti-inflammatory medication or Vitamin E. However, Tylenol is acceptable. Many herbal supplements and vitamins found over the counter may also increase bleeding risk. For this reason, we ask that you provide the names of all prescription and over the counter products you use or take. At your pre-operative visit, we will instruct you on when to restart these products.      ONE WEEK BEFORE SURGERY:   Do not drink alcohol   Please ensure that all required laboratory tests and/or imaging have been completed by this time. Our fax number is [phone]   Please stop applying self-josi if used      THE DAY BEFORE SURGERY:   The hospital will call you after noon to inform you of your arrival time for surgery.   DO NOT EAT OR DRINK ANYTHING AFTER MIDNIGHT (This includes water and gum chewing). Surgery may be cancelled if this is not followed. A fasting state is required in order to receive sedation for surgery, unless you are instructed to take a specific medication with a small sip of water the morning of surgery.      DAY OF SURGERY:   Do not wear makeup or use hair sprays/gels.   NO ACRYLIC NAILS OR NAIL POLISH (INCLUDING GEL/POWDER). NO EYELASH EXTENSIONS OR MASCARA (flammable).   Do not wear contact lenses. Glasses are ok to wear.   Avoid clothing that must be pulled over the head. Wear loose fitting clothing (button down or zip up).   If you wear dentures, please leave them in place.   Do not wear jewelry (including body jewelry) or bring valuables to surgery.   If you have your menses, please wear a  menstrual napkin not a tampon.      MEDICATIONS:  It is recommended that patients DO NOT take the following medicines. You MUST discuss stopping these medications with the prescribing provider before stopping:     MAO inhibitors (for example, Nardil and Parnate) should be stopped two weeks prior to surgery.  Blood thinners (including, but not limited to, aspirin products, ibuprofen, Motrin, Plavix, Eliquis, Warfarin, Effient, Pradaxa, and NSAIDS) should be stopped one week prior to surgery unless otherwise instructed by your prescribing physician and surgeon.  All amphetamine class of medications (for example, Adderall, Vyvanse, Ritalin, Concentra, Strattera, etc.) should be stopped one day prior to surgery unless otherwise instructed by your prescribing physician.   ACE inhibitors (for example, Lisinopril, Enalapril, Quinapril, etc.) should NOT be taken the morning of surgery.  Diuretics, also known as water pills (for example, furosemide, and hydrochlorothiazide), should NOT be taken the morning of surgery.  Buprenorphine (e.g., Suboxone, Subutex, Buprenex, Belbuca, Bunavail, Butrans, Probuphine, Sublocade, Zubsolv, and potentially other drug brand names) requires special consideration prior to surgery. Please notify your surgeon as soon as possible if you are taking this medication.  Angiotensin receptor blockers (ARBs) (for example, losartan, valsartan, Benicar, Cozaar, etc.) should NOT be taken the morning of surgery.   Dietary supplements and vitamins should be stopped two weeks prior to surgery.  Herbal medications should be stopped two weeks prior to surgery.  Diet pills (for example, Pondamin, Fastin, Phentermine) should be stopped two weeks prior to surgery  Contrave and Revia should be stopped 72 hours prior to surgery.  For patients who have diabetes, it is best to receive instructions from your physician who prescribes your diabetic medication. Instructions will be reviewed during your pre-op phone call  to discuss the medications you should take the evening before and the day of surgery.  Marijuana: stop 2-4 weeks before surgery

## 2024-04-18 ENCOUNTER — OFFICE VISIT (OUTPATIENT)
Dept: PLASTIC SURGERY | Facility: CLINIC | Age: 29
End: 2024-04-18
Payer: COMMERCIAL

## 2024-04-18 DIAGNOSIS — Z01.818 PRE-OP EXAMINATION: Primary | ICD-10-CM

## 2024-04-18 PROCEDURE — 99999 PR OFFICE/OUTPT VISIT,PROCEDURE ONLY: CPT | Performed by: PHYSICIAN ASSISTANT

## 2024-04-18 RX ORDER — ONDANSETRON 4 MG/1
4 TABLET, ORALLY DISINTEGRATING ORAL EVERY 8 HOURS PRN
Qty: 10 TABLET | Refills: 0 | Status: SHIPPED | OUTPATIENT
Start: 2024-04-18 | End: 2024-05-14 | Stop reason: ENTERED-IN-ERROR

## 2024-04-18 RX ORDER — DOCUSATE SODIUM 100 MG/1
100 CAPSULE, LIQUID FILLED ORAL 2 TIMES DAILY
Qty: 28 CAPSULE | Refills: 0 | Status: SHIPPED | OUTPATIENT
Start: 2024-04-18 | End: 2024-05-02

## 2024-04-18 RX ORDER — OXYCODONE HYDROCHLORIDE 5 MG/1
5 TABLET ORAL EVERY 4 HOURS PRN
Qty: 10 TABLET | Refills: 0 | Status: SHIPPED | OUTPATIENT
Start: 2024-04-18 | End: 2024-04-28

## 2024-04-18 NOTE — PATIENT INSTRUCTIONS
PRE-OPERATIVE INSTRUCTIONS   - Nothing to eat or drink after midnight the night before surgery  - purchase Hibiclens (chlorhexidine soap) from pharmacy and wash body (except face and genitalia) the night before and morning of.   -  prescribed medications and Narcan prior to surgery  - Please send rapid at home COVID test result to our clinic within 5 days of surgery      SIX WEEKS BEFORE AND AFTER SURGERY:   You will need to refrain from all nicotine products, including cigarettes, pipe tobacco, chew or “the patch.” Nicotine interferes with healthy circulation and may affect the result of your surgery. It also places you at higher risk of complications when receiving anesthesia.   If your nicotine test comes back positive, your surgery will be cancelled.       THREE WEEKS BEFORE SURGERY:   Based on your individualized assessment, we may request for laboratory tests and/or EKG. If you are having testing at a lab other than the one we suggested, you are responsible for having the hard copy results arrive at our office one full week before surgery (our fax number is [phone]).   A mammogram done within the past year is required for any sex assigned at birth female at or over the age of 35 for any type of chest surgery.   SURGERY WILL NEED TO BE CANCELLED IF THERE IS ANY CHANCE THAT YOU ARE PREGNANT.      TWO WEEKS BEFORE SURGERY:   We ask that you do not take any products that “thin the blood” in order to minimize bleeding during and after surgery. For example, avoid products containing aspirin, ibuprofen (Advil, Motrin, Aleve), non-steroidal anti-inflammatory medication or Vitamin E. However, Tylenol is acceptable. Many herbal supplements and vitamins found over the counter may also increase bleeding risk. For this reason, we ask that you provide the names of all prescription and over the counter products you use or take. At your pre-operative visit, we will instruct you on when to restart these  products.      ONE WEEK BEFORE SURGERY:   Do not drink alcohol   Please ensure that all required laboratory tests and/or imaging have been completed by this time. Our fax number is [phone]   Please stop applying self-josi if used      THE DAY BEFORE SURGERY:   The hospital will call you after noon to inform you of your arrival time for surgery.   DO NOT EAT OR DRINK ANYTHING AFTER MIDNIGHT (This includes water and gum chewing). Surgery may be cancelled if this is not followed. A fasting state is required in order to receive sedation for surgery, unless you are instructed to take a specific medication with a small sip of water the morning of surgery.      DAY OF SURGERY:   Do not wear makeup or use hair sprays/gels.   NO ACRYLIC NAILS OR NAIL POLISH (INCLUDING GEL/POWDER). NO EYELASH EXTENSIONS OR MASCARA (flammable).   Do not wear contact lenses. Glasses are ok to wear.   Avoid clothing that must be pulled over the head. Wear loose fitting clothing (button down or zip up).   If you wear dentures, please leave them in place.   Do not wear jewelry (including body jewelry) or bring valuables to surgery.   If you have your menses, please wear a menstrual napkin not a tampon.      MEDICATIONS:  It is recommended that patients DO NOT take the following medicines. You MUST discuss stopping these medications with the prescribing provider before stopping:     MAO inhibitors (for example, Nardil and Parnate) should be stopped two weeks prior to surgery.  Blood thinners (including, but not limited to, aspirin products, ibuprofen, Motrin, Plavix, Eliquis, Warfarin, Effient, Pradaxa, and NSAIDS) should be stopped one week prior to surgery unless otherwise instructed by your prescribing physician and surgeon.  All amphetamine class of medications (for example, Adderall, Vyvanse, Ritalin, Concentra, Strattera, etc.) should be stopped one day prior to surgery unless otherwise instructed by your prescribing physician.   ACE  inhibitors (for example, Lisinopril, Enalapril, Quinapril, etc.) should NOT be taken the morning of surgery.  Diuretics, also known as water pills (for example, furosemide, and hydrochlorothiazide), should NOT be taken the morning of surgery.  Buprenorphine (e.g., Suboxone, Subutex, Buprenex, Belbuca, Bunavail, Butrans, Probuphine, Sublocade, Zubsolv, and potentially other drug brand names) requires special consideration prior to surgery. Please notify your surgeon as soon as possible if you are taking this medication.  Angiotensin receptor blockers (ARBs) (for example, losartan, valsartan, Benicar, Cozaar, etc.) should NOT be taken the morning of surgery.   Dietary supplements and vitamins should be stopped two weeks prior to surgery.  Herbal medications should be stopped two weeks prior to surgery.  Diet pills (for example, Pondamin, Fastin, Phentermine) should be stopped two weeks prior to surgery  Contrave and Revia should be stopped 72 hours prior to surgery.  For patients who have diabetes, it is best to receive instructions from your physician who prescribes your diabetic medication. Instructions will be reviewed during your pre-op phone call to discuss the medications you should take the evening before and the day of surgery.  Marijuana: stop 2-4 weeks before surgery

## 2024-05-14 ENCOUNTER — PRE-ADMISSION TESTING (OUTPATIENT)
Dept: PREADMISSION TESTING | Age: 29
End: 2024-05-14
Payer: COMMERCIAL

## 2024-05-14 VITALS — WEIGHT: 190 LBS | HEIGHT: 72 IN | BODY MASS INDEX: 25.73 KG/M2

## 2024-05-14 NOTE — PRE-PROCEDURE INSTRUCTIONS
1.       Admissions will call you with your arrival time on  May 20, 2024 (day prior to surgery) between 2pm-4pm.  For questions about your arrival time, please call [phone].    2.       On the day of your procedure please report to the [facility] in the [place]. Please arrive through the [place] [facility].  If you are parking in the [place] [facility], come to the ground floor of the garage and follow signs to the [facility].  After being screened, please report to either the Outpatient Registration for Pre-Admission Testing or to the Surgery Registration Desk.    3. Please follow the following fasting guidelines:     No solid food EIGHT HOURS prior to arrival time on day of surgery.  6 ounces of clear liquids, meaning water or PLAIN black coffee WITHOUT any milk, cream, sugar, or sweetener are permitted up to TWO HOURS prior to arrival at the hospital.    4. Please take ONLY the following medications with a sip of water on the morning of your procedure: No aspirin,motrin,Ibuprofin,NSAIDS,vitamins,herbal supplements a week prior to procedure. Can take tylenol as needed for pain. Follow other medicine instructions per Dr's office.    5. Other Instructions: You may brush your teeth the morning of the procedure. Rinse and spit, do not swallow.  Bring a list of your medications with dosages.  Use surgical wash as directed.     6. If you develop a cold, cough, fever, rash, or other symptom prior to the day of the procedure, please report it to your physician immediately.    7. If you need to cancel the procedure for any reason, please contact your physician.    8. Make arrangements to have safe transportation home accompanied by a responsible adult. If you have not arranged safe transportation home, your surgery will be cancelled. Safe transportation may include private vehicle, ride-share service, taxi and public transportation when accompanied by a responsible adult who will assist you  home. A responsible adult is someone known to you and does not include the taxi, ride-share or public transit drive transporting you.    9.  If it is medically necessary for you to have a longer stay, you will be informed as soon as the decision is made.    10. Only bring essential items to the hospital.  Do not wear or bring anything of value to the hospital including jewelry of any kind, money, or wallet. Do not wear make-up or contact lenses.  DO NOT BRING MEDICATIONS FROM HOME unless instructed to do so. DO bring your hearing aids, glasses, and a case.Bring ID and insurance cards.    11. No lotion, creams, powders, or oils on skin the morning of procedure     12. Dress in comfortable clothes.    13.  If instructed, please bring a copy of your Advanced Directive (Living Will/Durable Power of ) on the day of your procedure.     14. Ensuring your safety at all times is a very important part of our [facility] Culture of Safety. After having surgery and sedation, you are at risk for falling and balance issues. Although you may feel awake, the effects of the medication can last up to 24 hours after anesthesia. If you need to use the bathroom during your recovery period, nursing staff will escort you there and stay with you to ensure your safety.    15. Refrain from drinking alcohol and smoking cigarettes for 24 hours prior to surgery.    16. Shower with antibacterial soap (Dial) the night before and morning of your procedure.  If your procedure indicates the need for CHG antiseptic wash (Bactoshield or Hibiclens), please use this instead and follow instructions as discussed at the time of your Pre-Admission Testing phone interview or visit.    Above instructions reviewed with patient and patient acknowledges understanding.    Form explained by: Juanita Arzate RN

## 2024-05-16 ENCOUNTER — PREP FOR CASE (OUTPATIENT)
Dept: PLASTIC SURGERY | Facility: CLINIC | Age: 29
End: 2024-05-16
Payer: COMMERCIAL

## 2024-05-16 RX ORDER — ACETAMINOPHEN 325 MG/1
975 TABLET ORAL ONCE
Status: CANCELLED | OUTPATIENT
Start: 2024-05-21 | End: 2024-05-21

## 2024-05-16 RX ORDER — VANCOMYCIN HYDROCHLORIDE
1.25
Status: CANCELLED | OUTPATIENT
Start: 2024-05-21

## 2024-05-16 RX ORDER — SCOPOLAMINE 1 MG/3D
1 PATCH, EXTENDED RELEASE TRANSDERMAL
Status: CANCELLED | OUTPATIENT
Start: 2024-05-21

## 2024-05-16 RX ORDER — GABAPENTIN 300 MG/1
600 CAPSULE ORAL ONCE
Status: CANCELLED | OUTPATIENT
Start: 2024-05-21 | End: 2024-05-21

## 2024-05-16 RX ORDER — TRANEXAMIC ACID 10 MG/ML
1000 INJECTION, SOLUTION INTRAVENOUS 2 TIMES DAILY PRN
Status: CANCELLED | OUTPATIENT
Start: 2024-05-21

## 2024-05-20 ENCOUNTER — ANESTHESIA EVENT (OUTPATIENT)
Dept: OPERATING ROOM | Facility: HOSPITAL | Age: 29
Setting detail: SURGERY ADMIT
DRG: 876 | End: 2024-05-20
Payer: COMMERCIAL

## 2024-05-20 NOTE — ANESTHESIA PREPROCEDURE EVALUATION
Relevant Problems   No relevant active problems       Anesthesia ROS/MED HX    Anesthesia History - neg  Pulmonary - neg  Cardiovascular- neg  Hematological - neg  GI/Hepatic- neg  Musculoskeletal- neg  Renal Disease- neg  Endo/Other- neg  ROS/MED HX Comments:    Neurology/Psychology: ADHD; gender dysphoria       Past Surgical History:   Procedure Laterality Date    SKIN BIOPSY      chest in 2024    VAGINOPLASTY  2020       Physical Exam    Airway   Mallampati: II   TM distance: >3 FB   Neck ROM: full  Cardiovascular - normal   Rhythm: regular   Rate: normalPulmonary - normal   clear to auscultation  Dental - normal        Anesthesia Plan    Plan: general    Technique: general endotracheal     Lines and Monitors: PIV     Airway: oral intubation    1 ASA  Anesthetic plan and risks discussed with: patient  Induction:    intravenous   Postop Plan:   Patient Disposition: phase II then home   Pain Management: IV analgesics

## 2024-05-21 ENCOUNTER — ANESTHESIA (OUTPATIENT)
Dept: OPERATING ROOM | Facility: HOSPITAL | Age: 29
Setting detail: SURGERY ADMIT
DRG: 876 | End: 2024-05-21
Payer: COMMERCIAL

## 2024-05-21 ENCOUNTER — HOSPITAL ENCOUNTER (INPATIENT)
Facility: HOSPITAL | Age: 29
LOS: 1 days | Discharge: HOME | DRG: 876 | End: 2024-05-22
Attending: PLASTIC SURGERY | Admitting: PLASTIC SURGERY
Payer: COMMERCIAL

## 2024-05-21 PROBLEM — F64.9 GENDER DYSPHORIA: Status: ACTIVE | Noted: 2024-05-21

## 2024-05-21 PROCEDURE — 63700000 HC SELF-ADMINISTRABLE DRUG: Performed by: PHYSICIAN ASSISTANT

## 2024-05-21 PROCEDURE — 0TUD07Z SUPPLEMENT URETHRA WITH AUTOLOGOUS TISSUE SUBSTITUTE, OPEN APPROACH: ICD-10-PCS | Performed by: PLASTIC SURGERY

## 2024-05-21 PROCEDURE — 37000001 HC ANESTHESIA GENERAL: Performed by: PLASTIC SURGERY

## 2024-05-21 PROCEDURE — 25000000 HC PHARMACY GENERAL: Performed by: NURSE ANESTHETIST, CERTIFIED REGISTERED

## 2024-05-21 PROCEDURE — 36000003 HC OR LEVEL 3 INITIAL 30MIN: Performed by: PLASTIC SURGERY

## 2024-05-21 PROCEDURE — 12000000 HC ROOM AND CARE MED/SURG

## 2024-05-21 PROCEDURE — 63600000 HC DRUGS/DETAIL CODE: Mod: JZ | Performed by: PHYSICIAN ASSISTANT

## 2024-05-21 PROCEDURE — C1729 CATH, DRAINAGE: HCPCS | Performed by: PLASTIC SURGERY

## 2024-05-21 PROCEDURE — 57335 REPAIR VAGINA: CPT | Performed by: PLASTIC SURGERY

## 2024-05-21 PROCEDURE — 63600000 HC DRUGS/DETAIL CODE: Mod: JZ | Performed by: PLASTIC SURGERY

## 2024-05-21 PROCEDURE — 14041 TIS TRNFR F/C/C/M/N/A/G/H/F: CPT | Performed by: PLASTIC SURGERY

## 2024-05-21 PROCEDURE — 0HXAXZZ TRANSFER INGUINAL SKIN, EXTERNAL APPROACH: ICD-10-PCS | Performed by: PLASTIC SURGERY

## 2024-05-21 PROCEDURE — 27200000 HC STERILE SUPPLY: Performed by: PLASTIC SURGERY

## 2024-05-21 PROCEDURE — 63600000 HC DRUGS/DETAIL CODE: Mod: JZ | Performed by: NURSE ANESTHETIST, CERTIFIED REGISTERED

## 2024-05-21 PROCEDURE — 25000000 HC PHARMACY GENERAL: Performed by: PLASTIC SURGERY

## 2024-05-21 PROCEDURE — 71000001 HC PACU PHASE 1 INITIAL 30MIN: Performed by: PLASTIC SURGERY

## 2024-05-21 PROCEDURE — 63600000 HC DRUGS/DETAIL CODE: Mod: JZ | Performed by: ANESTHESIOLOGY

## 2024-05-21 PROCEDURE — 57335 REPAIR VAGINA: CPT | Mod: 82AS | Performed by: PHYSICIAN ASSISTANT

## 2024-05-21 PROCEDURE — 63700000 HC SELF-ADMINISTRABLE DRUG: Performed by: PLASTIC SURGERY

## 2024-05-21 PROCEDURE — 25800000 HC PHARMACY IV SOLUTIONS: Performed by: NURSE ANESTHETIST, CERTIFIED REGISTERED

## 2024-05-21 PROCEDURE — 71000011 HC PACU PHASE 1 EA ADDL MIN: Performed by: PLASTIC SURGERY

## 2024-05-21 PROCEDURE — 36000013 HC OR LEVEL 3 EA ADDL MIN: Performed by: PLASTIC SURGERY

## 2024-05-21 RX ORDER — ONDANSETRON 4 MG/1
4 TABLET, ORALLY DISINTEGRATING ORAL EVERY 8 HOURS PRN
Status: DISCONTINUED | OUTPATIENT
Start: 2024-05-21 | End: 2024-05-22 | Stop reason: HOSPADM

## 2024-05-21 RX ORDER — ACETAMINOPHEN 325 MG/1
975 TABLET ORAL EVERY 6 HOURS
Status: DISCONTINUED | OUTPATIENT
Start: 2024-05-21 | End: 2024-05-22 | Stop reason: HOSPADM

## 2024-05-21 RX ORDER — LIDOCAINE HYDROCHLORIDE 10 MG/ML
INJECTION, SOLUTION INFILTRATION; PERINEURAL AS NEEDED
Status: DISCONTINUED | OUTPATIENT
Start: 2024-05-21 | End: 2024-05-21 | Stop reason: SURG

## 2024-05-21 RX ORDER — SODIUM CHLORIDE 9 MG/ML
INJECTION, SOLUTION INTRAVENOUS CONTINUOUS PRN
Status: DISCONTINUED | OUTPATIENT
Start: 2024-05-21 | End: 2024-05-21 | Stop reason: SURG

## 2024-05-21 RX ORDER — EPINEPHRINE 1 MG/ML
INJECTION, SOLUTION INTRAMUSCULAR; SUBCUTANEOUS
Status: DISCONTINUED | OUTPATIENT
Start: 2024-05-21 | End: 2024-05-21 | Stop reason: HOSPADM

## 2024-05-21 RX ORDER — OXYCODONE HYDROCHLORIDE 5 MG/1
5 TABLET ORAL EVERY 4 HOURS PRN
Status: DISCONTINUED | OUTPATIENT
Start: 2024-05-21 | End: 2024-05-22 | Stop reason: HOSPADM

## 2024-05-21 RX ORDER — SODIUM CHLORIDE, SODIUM GLUCONATE, SODIUM ACETATE, POTASSIUM CHLORIDE AND MAGNESIUM CHLORIDE 30; 37; 368; 526; 502 MG/100ML; MG/100ML; MG/100ML; MG/100ML; MG/100ML
INJECTION, SOLUTION INTRAVENOUS CONTINUOUS PRN
Status: DISCONTINUED | OUTPATIENT
Start: 2024-05-21 | End: 2024-05-21 | Stop reason: SURG

## 2024-05-21 RX ORDER — IBUPROFEN 200 MG
16-32 TABLET ORAL AS NEEDED
Status: DISCONTINUED | OUTPATIENT
Start: 2024-05-21 | End: 2024-05-22 | Stop reason: HOSPADM

## 2024-05-21 RX ORDER — MIDAZOLAM HYDROCHLORIDE 2 MG/2ML
INJECTION, SOLUTION INTRAMUSCULAR; INTRAVENOUS AS NEEDED
Status: DISCONTINUED | OUTPATIENT
Start: 2024-05-21 | End: 2024-05-21 | Stop reason: SURG

## 2024-05-21 RX ORDER — TRANEXAMIC ACID 10 MG/ML
1000 INJECTION, SOLUTION INTRAVENOUS 2 TIMES DAILY PRN
Status: DISCONTINUED | OUTPATIENT
Start: 2024-05-21 | End: 2024-05-21

## 2024-05-21 RX ORDER — EPHEDRINE SULFATE 50 MG/ML
INJECTION, SOLUTION INTRAVENOUS AS NEEDED
Status: DISCONTINUED | OUTPATIENT
Start: 2024-05-21 | End: 2024-05-21 | Stop reason: SURG

## 2024-05-21 RX ORDER — DEXTROSE 50 % IN WATER (D50W) INTRAVENOUS SYRINGE
25 AS NEEDED
Status: DISCONTINUED | OUTPATIENT
Start: 2024-05-21 | End: 2024-05-21

## 2024-05-21 RX ORDER — VANCOMYCIN HYDROCHLORIDE
1.25
Status: COMPLETED | OUTPATIENT
Start: 2024-05-21 | End: 2024-05-21

## 2024-05-21 RX ORDER — ONDANSETRON HYDROCHLORIDE 2 MG/ML
INJECTION, SOLUTION INTRAVENOUS AS NEEDED
Status: DISCONTINUED | OUTPATIENT
Start: 2024-05-21 | End: 2024-05-21 | Stop reason: SURG

## 2024-05-21 RX ORDER — LABETALOL HCL 20 MG/4 ML
10 SYRINGE (ML) INTRAVENOUS
Status: DISCONTINUED | OUTPATIENT
Start: 2024-05-21 | End: 2024-05-22 | Stop reason: HOSPADM

## 2024-05-21 RX ORDER — SCOPOLAMINE 1 MG/3D
1 PATCH, EXTENDED RELEASE TRANSDERMAL
Status: DISCONTINUED | OUTPATIENT
Start: 2024-05-21 | End: 2024-05-22 | Stop reason: HOSPADM

## 2024-05-21 RX ORDER — TRANEXAMIC ACID 10 MG/ML
1000 INJECTION, SOLUTION INTRAVENOUS ONCE
Status: COMPLETED | OUTPATIENT
Start: 2024-05-21 | End: 2024-05-21

## 2024-05-21 RX ORDER — PROPOFOL 10 MG/ML
INJECTION, EMULSION INTRAVENOUS AS NEEDED
Status: DISCONTINUED | OUTPATIENT
Start: 2024-05-21 | End: 2024-05-21 | Stop reason: SURG

## 2024-05-21 RX ORDER — IBUPROFEN 200 MG
16-32 TABLET ORAL AS NEEDED
Status: DISCONTINUED | OUTPATIENT
Start: 2024-05-21 | End: 2024-05-21

## 2024-05-21 RX ORDER — ACETAMINOPHEN 325 MG/1
TABLET ORAL
Status: DISPENSED
Start: 2024-05-21 | End: 2024-05-21

## 2024-05-21 RX ORDER — DEXTROSE 40 %
15-30 GEL (GRAM) ORAL AS NEEDED
Status: DISCONTINUED | OUTPATIENT
Start: 2024-05-21 | End: 2024-05-21 | Stop reason: HOSPADM

## 2024-05-21 RX ORDER — SCOPOLAMINE 1 MG/3D
PATCH, EXTENDED RELEASE TRANSDERMAL
Status: DISPENSED
Start: 2024-05-21 | End: 2024-05-21

## 2024-05-21 RX ORDER — SODIUM CHLORIDE 9 MG/ML
INJECTION, SOLUTION INTRAMUSCULAR; INTRAVENOUS; SUBCUTANEOUS
Status: DISCONTINUED | OUTPATIENT
Start: 2024-05-21 | End: 2024-05-21 | Stop reason: HOSPADM

## 2024-05-21 RX ORDER — FENTANYL CITRATE 50 UG/ML
INJECTION, SOLUTION INTRAMUSCULAR; INTRAVENOUS AS NEEDED
Status: DISCONTINUED | OUTPATIENT
Start: 2024-05-21 | End: 2024-05-21 | Stop reason: SURG

## 2024-05-21 RX ORDER — ROCURONIUM BROMIDE 10 MG/ML
INJECTION, SOLUTION INTRAVENOUS AS NEEDED
Status: DISCONTINUED | OUTPATIENT
Start: 2024-05-21 | End: 2024-05-21 | Stop reason: SURG

## 2024-05-21 RX ORDER — HYDRALAZINE HYDROCHLORIDE 20 MG/ML
10 INJECTION INTRAMUSCULAR; INTRAVENOUS
Status: DISCONTINUED | OUTPATIENT
Start: 2024-05-21 | End: 2024-05-22 | Stop reason: HOSPADM

## 2024-05-21 RX ORDER — HYDROMORPHONE HYDROCHLORIDE 1 MG/ML
0.5 INJECTION, SOLUTION INTRAMUSCULAR; INTRAVENOUS; SUBCUTANEOUS
Status: DISCONTINUED | OUTPATIENT
Start: 2024-05-21 | End: 2024-05-21 | Stop reason: HOSPADM

## 2024-05-21 RX ORDER — DEXTROSE 40 %
15-30 GEL (GRAM) ORAL AS NEEDED
Status: DISCONTINUED | OUTPATIENT
Start: 2024-05-21 | End: 2024-05-22 | Stop reason: HOSPADM

## 2024-05-21 RX ORDER — GABAPENTIN 300 MG/1
CAPSULE ORAL
Status: DISPENSED
Start: 2024-05-21 | End: 2024-05-21

## 2024-05-21 RX ORDER — FENTANYL CITRATE 50 UG/ML
50 INJECTION, SOLUTION INTRAMUSCULAR; INTRAVENOUS EVERY 5 MIN PRN
Status: COMPLETED | OUTPATIENT
Start: 2024-05-21 | End: 2024-05-21

## 2024-05-21 RX ORDER — ACETAMINOPHEN 325 MG/1
975 TABLET ORAL ONCE
Status: COMPLETED | OUTPATIENT
Start: 2024-05-21 | End: 2024-05-21

## 2024-05-21 RX ORDER — GABAPENTIN 300 MG/1
600 CAPSULE ORAL ONCE
Status: COMPLETED | OUTPATIENT
Start: 2024-05-21 | End: 2024-05-21

## 2024-05-21 RX ORDER — DEXTROSE 50 % IN WATER (D50W) INTRAVENOUS SYRINGE
25 AS NEEDED
Status: DISCONTINUED | OUTPATIENT
Start: 2024-05-21 | End: 2024-05-21 | Stop reason: HOSPADM

## 2024-05-21 RX ORDER — METRONIDAZOLE 7.5 MG/G
GEL VAGINAL
Status: DISCONTINUED | OUTPATIENT
Start: 2024-05-21 | End: 2024-05-21 | Stop reason: HOSPADM

## 2024-05-21 RX ORDER — DEXTROSE 40 %
15-30 GEL (GRAM) ORAL AS NEEDED
Status: DISCONTINUED | OUTPATIENT
Start: 2024-05-21 | End: 2024-05-21

## 2024-05-21 RX ORDER — ONDANSETRON HYDROCHLORIDE 2 MG/ML
4 INJECTION, SOLUTION INTRAVENOUS EVERY 8 HOURS PRN
Status: DISCONTINUED | OUTPATIENT
Start: 2024-05-21 | End: 2024-05-22 | Stop reason: HOSPADM

## 2024-05-21 RX ORDER — IBUPROFEN 200 MG
16-32 TABLET ORAL AS NEEDED
Status: DISCONTINUED | OUTPATIENT
Start: 2024-05-21 | End: 2024-05-21 | Stop reason: HOSPADM

## 2024-05-21 RX ORDER — PHENYLEPHRINE HYDROCHLORIDE 10 MG/ML
INJECTION INTRAVENOUS AS NEEDED
Status: DISCONTINUED | OUTPATIENT
Start: 2024-05-21 | End: 2024-05-21 | Stop reason: SURG

## 2024-05-21 RX ORDER — DEXTROSE 50 % IN WATER (D50W) INTRAVENOUS SYRINGE
25 AS NEEDED
Status: DISCONTINUED | OUTPATIENT
Start: 2024-05-21 | End: 2024-05-22 | Stop reason: HOSPADM

## 2024-05-21 RX ORDER — DEXAMETHASONE SODIUM PHOSPHATE 4 MG/ML
INJECTION, SOLUTION INTRA-ARTICULAR; INTRALESIONAL; INTRAMUSCULAR; INTRAVENOUS; SOFT TISSUE AS NEEDED
Status: DISCONTINUED | OUTPATIENT
Start: 2024-05-21 | End: 2024-05-21 | Stop reason: SURG

## 2024-05-21 RX ORDER — SULFAMETHOXAZOLE AND TRIMETHOPRIM 800; 160 MG/1; MG/1
1 TABLET ORAL EVERY 12 HOURS
Qty: 14 TABLET | Refills: 0 | Status: DISCONTINUED | OUTPATIENT
Start: 2024-05-21 | End: 2024-05-22 | Stop reason: HOSPADM

## 2024-05-21 RX ADMIN — DEXAMETHASONE SODIUM PHOSPHATE 4 MG: 4 INJECTION, SOLUTION INTRA-ARTICULAR; INTRALESIONAL; INTRAMUSCULAR; INTRAVENOUS; SOFT TISSUE at 07:38

## 2024-05-21 RX ADMIN — SODIUM CHLORIDE, SODIUM GLUCONATE, SODIUM ACETATE, POTASSIUM CHLORIDE AND MAGNESIUM CHLORIDE: 526; 502; 368; 37; 30 INJECTION, SOLUTION INTRAVENOUS at 09:55

## 2024-05-21 RX ADMIN — SULFAMETHOXAZOLE AND TRIMETHOPRIM 1 TABLET: 800; 160 TABLET ORAL at 21:01

## 2024-05-21 RX ADMIN — FENTANYL CITRATE 50 MCG: 50 INJECTION, SOLUTION INTRAMUSCULAR; INTRAVENOUS at 10:59

## 2024-05-21 RX ADMIN — SUGAMMADEX 200 MG: 100 INJECTION, SOLUTION INTRAVENOUS at 09:58

## 2024-05-21 RX ADMIN — FENTANYL CITRATE 50 MCG: 50 INJECTION, SOLUTION INTRAMUSCULAR; INTRAVENOUS at 12:53

## 2024-05-21 RX ADMIN — FENTANYL CITRATE 50 MCG: 50 INJECTION INTRAMUSCULAR; INTRAVENOUS at 09:03

## 2024-05-21 RX ADMIN — PHENYLEPHRINE HYDROCHLORIDE 200 MCG: 10 INJECTION INTRAVENOUS at 08:16

## 2024-05-21 RX ADMIN — SULFAMETHOXAZOLE AND TRIMETHOPRIM 1 TABLET: 800; 160 TABLET ORAL at 12:53

## 2024-05-21 RX ADMIN — TRANEXAMIC ACID 1000 MG: 10 INJECTION, SOLUTION INTRAVENOUS at 07:50

## 2024-05-21 RX ADMIN — ONDANSETRON 4 MG: 2 INJECTION INTRAMUSCULAR; INTRAVENOUS at 09:57

## 2024-05-21 RX ADMIN — PROPOFOL 30 MCG/KG/MIN: 10 INJECTION, EMULSION INTRAVENOUS at 07:45

## 2024-05-21 RX ADMIN — EPHEDRINE SULFATE 10 MG: 50 INJECTION, SOLUTION INTRAVENOUS at 08:04

## 2024-05-21 RX ADMIN — PHENYLEPHRINE HYDROCHLORIDE 100 MCG: 10 INJECTION INTRAVENOUS at 08:04

## 2024-05-21 RX ADMIN — EPHEDRINE SULFATE 10 MG: 50 INJECTION, SOLUTION INTRAVENOUS at 07:51

## 2024-05-21 RX ADMIN — VANCOMYCIN HYDROCHLORIDE 1.25 G: 1 INJECTION, POWDER, LYOPHILIZED, FOR SOLUTION INTRAVENOUS at 07:30

## 2024-05-21 RX ADMIN — PHENYLEPHRINE HYDROCHLORIDE 200 MCG: 10 INJECTION INTRAVENOUS at 07:58

## 2024-05-21 RX ADMIN — ACETAMINOPHEN 975 MG: 325 TABLET ORAL at 14:39

## 2024-05-21 RX ADMIN — SODIUM CHLORIDE: 0.9 INJECTION, SOLUTION INTRAVENOUS at 07:30

## 2024-05-21 RX ADMIN — FENTANYL CITRATE 100 MCG: 50 INJECTION INTRAMUSCULAR; INTRAVENOUS at 07:34

## 2024-05-21 RX ADMIN — PHENYLEPHRINE HYDROCHLORIDE 100 MCG: 10 INJECTION INTRAVENOUS at 08:24

## 2024-05-21 RX ADMIN — PROPOFOL 200 MG: 10 INJECTION, EMULSION INTRAVENOUS at 07:34

## 2024-05-21 RX ADMIN — SCOPOLAMINE 1 PATCH: 1.5 PATCH, EXTENDED RELEASE TRANSDERMAL at 06:12

## 2024-05-21 RX ADMIN — ACETAMINOPHEN 975 MG: 325 TABLET ORAL at 06:13

## 2024-05-21 RX ADMIN — ACETAMINOPHEN 975 MG: 325 TABLET ORAL at 21:02

## 2024-05-21 RX ADMIN — PHENYLEPHRINE HYDROCHLORIDE 200 MCG: 10 INJECTION INTRAVENOUS at 08:10

## 2024-05-21 RX ADMIN — LIDOCAINE HYDROCHLORIDE 10 ML: 10 INJECTION, SOLUTION INFILTRATION; PERINEURAL at 07:34

## 2024-05-21 RX ADMIN — MIDAZOLAM HYDROCHLORIDE 2 MG: 1 INJECTION, SOLUTION INTRAMUSCULAR; INTRAVENOUS at 07:28

## 2024-05-21 RX ADMIN — EPHEDRINE SULFATE 10 MG: 50 INJECTION, SOLUTION INTRAVENOUS at 07:58

## 2024-05-21 RX ADMIN — GABAPENTIN 600 MG: 300 CAPSULE ORAL at 06:13

## 2024-05-21 RX ADMIN — ROCURONIUM BROMIDE 50 MG: 10 INJECTION, SOLUTION INTRAVENOUS at 07:36

## 2024-05-21 RX ADMIN — PHENYLEPHRINE HYDROCHLORIDE 100 MCG: 10 INJECTION INTRAVENOUS at 08:28

## 2024-05-21 RX ADMIN — PHENYLEPHRINE HYDROCHLORIDE 100 MCG: 10 INJECTION INTRAVENOUS at 07:55

## 2024-05-21 ASSESSMENT — COGNITIVE AND FUNCTIONAL STATUS - GENERAL
WALKING IN HOSPITAL ROOM: 3 - A LITTLE
MOVING TO AND FROM BED TO CHAIR: 3 - A LITTLE
STANDING UP FROM CHAIR USING ARMS: 3 - A LITTLE
WALKING IN HOSPITAL ROOM: 3 - A LITTLE
CLIMB 3 TO 5 STEPS WITH RAILING: 3 - A LITTLE
STANDING UP FROM CHAIR USING ARMS: 3 - A LITTLE
MOVING TO AND FROM BED TO CHAIR: 3 - A LITTLE
CLIMB 3 TO 5 STEPS WITH RAILING: 3 - A LITTLE

## 2024-05-21 NOTE — H&P
Plastic Surgery H&P        HPI: Vera presents today for preoperative evaluation prior to surgery. She is in her usual state of health. No recent illnesses. Deny fever, chills, CP, palpitations, SOB, abdominal discomfort, and changes in urine or bowel habits.      As discussed, you will need to do a rapid at home COVID test and send the results to our clinic, this should be done within 5 days of surgery.      Discussed stopping THC two weeks before and after surgery.         Physical Examination:  In general, patient is healthy appearing and in no acute distress.   Cardiac exam reveals regular rate and rhythm  Lungs are clear to auscultation bilaterally        A/P: Patient is scheduled for revision urethroplasty and possible labioplasty, possible skin graft, possible VY advancement flap. Plan to proceed with surgery as planned. Today we rediscussed the surgical procedure, risks of surgery, and post operative process. Risks discussed included, but were not limited to: bleeding, seroma, infection, delayed wound healing, unfavorable scarring, asymmetry, contour irregularity, urethral injury, riojas catheter placement, diverted urinary stream, skin necrosis, graft loss, change in sensation, change in function, anorgasmia, damage to adjacent structures, and need for future surgeries.      We discussed preoperative instructions and reviewed medications, discussing which ones to hold. We discussed the purchase of hibiclens soap and washing protocol the night before and the morning of surgery.      Post operative instructions were reviewed. Post operative medications were prescribed to the pharmacy of choice. These can be collected prior to surgery.      Discussed risks of opioid use with patient: risk of addiction and overdose, increased risk of addiction to controlled substances in individuals suffering from mental or substance use disorders, and dangers of taking a controlled substance containing an opioid with  benzodiazepines, alcohol, or other central nervous system depressants. Asked to purchase Narcan as they are being prescribed an opioid.        The [facility] is dedicated to providing high quality, patient centered gender affirming care. While regret is rare (fewer than 1% of patients experience surgical regret in long term studies), there are instances in which some individuals may regret their decision to undergo gender affirmation surgery. Regret may manifest in different ways, including change in social relationships, change in life goals or sexuality, surgical complications, or dissatisfaction with a surgical result. It is important that every patient's decision is informed by the risks and benefits, as well as clear expectations, which we reviewed today.

## 2024-05-21 NOTE — ANESTHESIA POSTPROCEDURE EVALUATION
Patient: Vera Cote    Procedure Summary       Date: 05/21/24 Room / Location: [facility] P[facility] OR 06 / [facility] OR PAV    Anesthesia Start: 0730 Anesthesia Stop: 1021    Procedure: URETHROPLASTY (Pelvis) Diagnosis:       Gender dysphoria      (Gender dysphoria [F64.9])    Surgeons: Shakila Chowdary MD Responsible Provider: Ry Trevino DO    Anesthesia Type: general ASA Status: 1            Anesthesia Type: general  PACU Vitals  5/21/2024 1014 - 5/21/2024 1059        5/21/2024  1020 5/21/2024  1030 5/21/2024  1045       BP: 101/50 96/52 102/55     Temp: 36.2 °C (97.2 °F) -- --     Pulse: 77 84 69     Resp: 14 15 16     SpO2: 99 % 99 % 100 %               Anesthesia Post Evaluation    Pain management: adequate  Patient participation: complete - patient participated  Level of consciousness: awake and alert  Cardiovascular status: acceptable  Airway Patency: adequate  Respiratory status: acceptable  Hydration status: acceptable  Anesthetic complications: no

## 2024-05-21 NOTE — OP NOTE
OPERATIVE NOTE    Preoperative Diagnosis: gender dysphoria    Postoperative Diagnosis: gender dysphoria    Procedure:   Urethroplasty  Adjacent tissue transfer, 24 cm2    Attending Surgeon: Shakila Chowdary MD     Assistant: Kayli Gonzalez PA-C (Kayli Gonzalez PA-C, served as first assistant for this case for her aid in suturing, retracting, and complex decision making as there was not a resident of appropriate level to assist.)     Anesthesia: general    IVF replacement: crystalloid    PBCs: none    Specimens: none    Implants: none    UOP: 100 cc    EBL: 50 cc    Cultures: none    Drains: riojas catheter    Complications: none immediate    Disposition: Stable to PACU.      Indications: Vera Cote is a 28 y.o. adult with a history of gender dysphoria s/p PIV who presents today for urethral revision. They were seen in consultation in clinic at which time staging the urethroplasty and labiaplasty were discussed. Specific risks of operative procedure including bleeding, seroma, infection, urethral injury, wound dehiscence, tissue necrosis, unfavorable aesthetic result, fistula, and need for further surgeries were explained to the patient and they were able to ask questions. Informed consent was obtained for photography as well as operative procedure. The operative site was marked in the preoperative holding area and the patient was then transferred back to the OR.     Procedure: Appropriate presurgical time outs were completed to verify patient identity, operation, surgical site and laterality, and anesthesia plan. The patient was transferred to the operating table. SCDs were applied to the bilateral lower extremities. Preoperative antibiotics were administered 30 minutes prior to incision. General anesthesia was induced. Appropriate pressure points were padded and the patient was positioned in the low lithotomy position. Skin was scrubbed with betadine. The patient was then draped in the normal sterile fashion.      We began by placing a riojas catheter sterilely to clearly identify the urethral course. We injected epinephrine in the subcutaneous plane to help with hemostasis and hydrodissecte between the skin and the urethra. We then made an incision from the apex of the urethra down the anterior vaginal wall. We elevated bilateral skin flaps off of the urethra mucosa using a combination of blunt and sharp dissection with tenotomy scissor. The size of these flaps were 3 cm x 4 cm and were based laterally. We identified the excess bulbospongiousus muscle and used electrocautery to dissect the muscle off of the urethra, being careful to not make a full thickness urethral injury. We then used a 3-0 vicryl suture to suture to close the lateral urethra mucosa to each other to ensure hemostatic effect. We irrigated with saline and ensured appropriate hemostasis. We then turned our attention to the urethral meatus. As she had an anteriorly directed urinary stream, we made an incision at the apex of the urethra inferiorly to allow the meatus to be pointed straighter. We then advanced the skin flaps in a Y-V fashion to close the inner mucosal edge to the apex of the urethra to the apex of the skin flaps. Given the tissue advancement, we needed to debride excess skin for closure. We sharply excised a 2.5 cm x 1 cm strip of tissue from the medial edge of each skin flap. We then closed the urethral mucosa to the skin using a 3-0 polysorb in the deeper layer followed by 3-0 chromic in the superficial layer.     A metronidazole gel packing was placed in the vaginal canal to help serve as a hemostatic bolster to the urethra.     All sponge and needle counts were correct at the end of the case x2.     The patient was awoken from anesthesia and transferred to the Post Anesthesia Care Unit in stable condition.     Shakila WELCH MD, was present and scrubbed for the entire procedure.

## 2024-05-21 NOTE — ANESTHESIA PROCEDURE NOTES
Airway  Urgency: elective    Start Time: 5/21/2024 7:38 AM    General Information and Staff    Patient location during procedure: OR  Anesthesiologist: Ry Trevino DO  Resident/CRNA: Carissa Klein CRNA  Performed: resident/CRNA   Performed by: Carissa Klein CRNA  Authorized by: Ry Trevino DO      Indications and Patient Condition  Indications for airway management: anesthesia  Sedation level: general  Preoxygenated: yes  Patient position: sniffing  Mask difficulty assessment: 1 - vent by mask    Final Airway Details  Final airway type: endotracheal airway      Successful airway: ETT  Cuffed: yes   Successful intubation technique: direct laryngoscopy  Facilitating devices/methods: intubating stylet  Endotracheal tube insertion site: oral  Blade: Abel  Blade size: #3  ETT size (mm): 7.0  Cormack-Lehane Classification: grade I - full view of glottis  Placement verified by: chest auscultation and capnometry   Cuff volume (mL): 5  Measured from: lips  ETT to lips (cm): 23  Number of attempts at approach: 1  Number of other approaches attempted: 0  Atraumatic airway insertion

## 2024-05-21 NOTE — OR SURGEON
Pre-Procedure patient identification:  I am the primary operating surgeon/proceduralist and I have identified the patient and confirmed laterality is neither on 05/21/24 at 7:17 AM     Shakila Chowdary MD  [phone]

## 2024-05-21 NOTE — ANESTHESIOLOGIST PRE-PROCEDURE ATTESTATION
Pre-Procedure Patient Identification:  I am the Primary Anesthesiologist and have identified the patient on 05/21/24 at 7:10 AM.   I have confirmed the procedure(s) will be performed by the following surgeon/proceduralist Shakila Chowdary MD.

## 2024-05-22 VITALS
OXYGEN SATURATION: 100 % | DIASTOLIC BLOOD PRESSURE: 57 MMHG | SYSTOLIC BLOOD PRESSURE: 101 MMHG | RESPIRATION RATE: 16 BRPM | TEMPERATURE: 97.8 F | HEART RATE: 54 BPM

## 2024-05-22 PROCEDURE — 63700000 HC SELF-ADMINISTRABLE DRUG: Performed by: PHYSICIAN ASSISTANT

## 2024-05-22 RX ADMIN — ACETAMINOPHEN 975 MG: 325 TABLET ORAL at 08:09

## 2024-05-22 RX ADMIN — SULFAMETHOXAZOLE AND TRIMETHOPRIM 1 TABLET: 800; 160 TABLET ORAL at 08:10

## 2024-05-22 RX ADMIN — ACETAMINOPHEN 975 MG: 325 TABLET ORAL at 03:10

## 2024-05-22 RX ADMIN — OXYCODONE HYDROCHLORIDE 5 MG: 5 TABLET ORAL at 00:43

## 2024-05-22 ASSESSMENT — COGNITIVE AND FUNCTIONAL STATUS - GENERAL
STANDING UP FROM CHAIR USING ARMS: 3 - A LITTLE
WALKING IN HOSPITAL ROOM: 4 - NONE
MOVING TO AND FROM BED TO CHAIR: 3 - A LITTLE
CLIMB 3 TO 5 STEPS WITH RAILING: 3 - A LITTLE

## 2024-05-22 NOTE — PLAN OF CARE
Care Coordination Discharge Plan Summary    Admission Assessment Summary    General Information  Readmission Within the last 30 days: no previous admission in last 30 days  Does patient have a : No  Patient-Specific Goals (include timeframe): to safely DC to home    Living Arrangements  Arrived From: home  Current Living Arrangements: home  People in Home: significant other  Home Accessibility: stairs to enter home (Group), stairs within home (Group)  Living Arrangement Comments: Pt lives with her fiance in 2SH with a flight of stairs to 2nd fl bed/full bath.    Social Determinants of Health - Screenings  Housing Stability  In the last 12 months, was there a time when you were not able to pay the mortgage or rent on time?: No  In the last 12 months, how many places have you lived?: 1  In the last 12 months, was there a time when you did not have a steady place to sleep or slept in a shelter (including now)?: No  Utility Access  In the past 12 months has the electric, gas, oil, or water company threatened to shut off services in your home?: No  Transportation Needs  In the past 12 months, has lack of transportation kept you from medical appointments or from getting medications?: No  In the past 12 months, has lack of transportation kept you from meetings, work, or from getting things needed for daily living?: No    Functional Status Prior to Admission  Assistive Device/Animal Currently Used at Home: none  Functional Status Comments: Pt uses no AD to ambulate at baseline.  IADL Comments: IND with ADLs and IADLs    Discharge Needs Assessment    Concerns to be Addressed: denies needs/concerns at this time, no discharge needs identified  Current Discharge Risk:    Anticipated Changes Related to Illness: none    Discharge Plan Summary    Patient Choice  Offered/Gave Vendor List: no       Concerns / Comments: Sw offered HH. Pt declined HH.    Discharge Plan:  Disposition/Destination: Home  /          Connection to Community  Not applicable  Community Resources:      Discharge Transportation:  Is Out of Hospital DNR needed at Discharge: no  Does patient need discharge transport?  No, family will transport    Pt S/P Urethroplasty. Sw met with pt at bedside this morning. Pt confirmed demo, PCP, pharm, insurance, and emergency contacts. Pt reports her family will transport her home.     Anticipated DC Plans  Home   Family will transport

## 2024-05-22 NOTE — PLAN OF CARE
Problem: Adult Inpatient Plan of Care  Goal: Absence of Hospital-Acquired Illness or Injury  Outcome: Progressing

## 2024-05-22 NOTE — DISCHARGE INSTRUCTIONS
General post-operative instructions      You are leaving with a riojas catheter in place. Please drain 4 times a day.   If you are saturating a menstrual pad, call our office. You will have some blood, but do not want you saturating a menstrual pad.    A responsible adult must provide transportation for you after surgery (public transportation is not  permissible). He/she/they must stay with you overnight and after surgery until the morning following the procedure. If you are having several procedures, you may need or prefer assistance for 1-2 days  following your procedure.   Avoid making major decisions or participating in activities that require judgment for 24 hours.   Take all medications as instructed.   Do not drive for the first 24 hours after surgery or while you are taking narcotics. Full clearance to drive will be based on procedure and your surgeon’s instruction.   Do not consume alcohol for 1 week after surgery or while taking narcotics   Take slow, deep breaths to expand and fill your lungs with air. This will prevent post-operative lung  complications, such as pneumonia and atelectasis (lung collapse). You should deep breathe 10 times every hour while awake for the first few days after surgery.   Slight temperature elevation during the first 48 hours after surgery is a natural consequence of the body’s reaction to surgical trauma. The Tylenol you take post operatively (either alone or in your pain medication) should control mild fevers. If the temperature is over 101, most of the time the cause is not walking or deep breathing enough. Call us if your temperature stays higher than 101.5 for more than 4 hours and does not respond to walking, deep breathing and coughing   Drink plenty of fluids (8-10 glasses/day) for the first couple of weeks after surgery as this will help you to remain well hydrated and reduce swelling.   Staying well hydrated will help prevent post-operative constipation. It is common  not to have a bowel movement for 2-5 days after surgery due to slower motility of the gastrointestinal system. This is usually due to the medications you receive during surgery as well as post-operative pain medication. In addition to drinking plenty of water, take Colace (a stool softener recommended at your pre-op visit) and taper pain medication as soon as possible. Occasionally, a stronger medication for constipation, such as Dulcolax tablets or Magnesium Citrate may be necessary.   If you have not urinated after 6 hours of being home from surgery, please contact our office.   If you are wearing a compression garment, using a funnel to direct urine flow may help keep the garment dry.   Limit your activities for the first 24 hours after surgery as you may feel tired for the first few days. Walk for short distances during the first 24 hours after surgery to promote blood flow in the lower body and prevent blood clots. In order to further prevent blood clot formation, we encourage you to pump legs while lying down.    You will have physical activity restrictions post operatively, which includes exercise and sexual activity. Return to physical activity will depend on the type of surgery you are having done and will be further discussed with your surgeon.   During the first few weeks following surgery it is common to weigh more than before surgery due to swelling and extra fluid. This will resolve over time.   No swimming or hot tub usage for at least 2 weeks after surgery - this may also be further discussed with your surgeon   Avoid direct sunlight to the incision for at least 1 year. Use a sunscreen with zinc oxide with an SPF of 30 or greater to help decrease the visibility of the scar.      PAIN REGIMEN:   Take acetaminophen 1000 mg every 6 hours   Take 600 mg ibuprofen every 6 hours   Alternate the ibuprofen and tylenol every three house (ie tylenol at 12, ibuprofen at 3, tylenol at 6, etc). Schedule this  around the clock for the first week   Take oxycodone 5-10 mg every 4 hours AS NEEDED for breakthrough pain    BOWEL REGIMEN:  Narcotics and anesthesia can result in constipation. Straining with bowel movements after surgery can caused increased blood pressure and lead to increased risk of bleeding. Take the prescribed stool softener while on narcotics. Can hold for loose stools. Drink lots of water to stay hydrated.      CALL THE OFFICE IF YOU DEVELOP ANY OF THE FOLLOWING EMERGENT ISSUES:   Fever of 101 degrees or greater   Pain not relieved with pain medication   Swelling, redness, bleeding, and or/foul smelling drainage from an incision site   Significant asymmetrical swelling, drainage, or pain in a bilateral procedure   Bleeding that does not respond to uninterrupted direct pressure for at least 20 minutes   Problems with drains that are not solved using the troubleshooting instructions that are provided   Persistent nausea and/or vomiting    *General medical questions can be addressed during regular business hours M-F 8am-5pm ([phone]).  EMERGENT surgical issues can be addressed on evenings or weekends via our on-call EMERGENCY Line ([phone])

## 2024-05-30 ENCOUNTER — OFFICE VISIT (OUTPATIENT)
Dept: PLASTIC SURGERY | Facility: CLINIC | Age: 29
End: 2024-05-30
Payer: COMMERCIAL

## 2024-05-30 VITALS
SYSTOLIC BLOOD PRESSURE: 116 MMHG | WEIGHT: 199 LBS | DIASTOLIC BLOOD PRESSURE: 78 MMHG | OXYGEN SATURATION: 99 % | BODY MASS INDEX: 27.86 KG/M2 | HEIGHT: 71 IN | HEART RATE: 88 BPM

## 2024-05-30 DIAGNOSIS — F64.9 GENDER DYSPHORIA: Primary | ICD-10-CM

## 2024-05-30 PROCEDURE — 99024 POSTOP FOLLOW-UP VISIT: CPT | Performed by: PHYSICIAN ASSISTANT

## 2024-05-30 RX ORDER — ESTRADIOL VALERATE 20 MG/ML
20 INJECTION INTRAMUSCULAR
COMMUNITY
Start: 2024-05-28

## 2024-05-30 RX ORDER — NAPROXEN 500 MG/1
500 TABLET ORAL 2 TIMES DAILY WITH MEALS
COMMUNITY
Start: 2024-05-28

## 2024-05-30 ASSESSMENT — PAIN SCALES - GENERAL: PAINLEVEL: 0-NO PAIN

## 2024-05-30 NOTE — ASSESSMENT & PLAN NOTE
Able to successfully void after riojas catheter removal. Can resume dilations, but not penetrative intercourse. Asked to contact the office if she notes any burning with urination, frequency, and/or urgency. Follow-up in two weeks.

## 2024-05-30 NOTE — PROGRESS NOTES
Plastic Surgery Clinic Note    Subjective: Vera is one week s/p urethroplasty. Riojas catheter has been draining clear yellow urine. Offers no complaints.     Objective: Examination was performed in conjunction with Dr. Chowdary. Wound dehiscence distally at the suture lines. Urethra with expected swelling. Riojas with clear yellow urine. Bladder filled with 150 cc of NS by Dr. Chowdary, riojas catheter removed. No signs of infection.     Assessment/Plan:   Gender dysphoria  Able to successfully void after riojas catheter removal. Can resume dilations, but not penetrative intercourse. Asked to contact the office if she notes any burning with urination, frequency, and/or urgency. Follow-up in two weeks.         BRIAN Dan C

## 2024-06-11 ENCOUNTER — OFFICE VISIT (OUTPATIENT)
Dept: PLASTIC SURGERY | Facility: CLINIC | Age: 29
End: 2024-06-11
Payer: COMMERCIAL

## 2024-06-11 VITALS
HEART RATE: 96 BPM | OXYGEN SATURATION: 98 % | DIASTOLIC BLOOD PRESSURE: 80 MMHG | BODY MASS INDEX: 27.86 KG/M2 | HEIGHT: 71 IN | WEIGHT: 199 LBS | SYSTOLIC BLOOD PRESSURE: 136 MMHG

## 2024-06-11 DIAGNOSIS — M62.89 PELVIC FLOOR DYSFUNCTION: Primary | ICD-10-CM

## 2024-06-11 DIAGNOSIS — F64.9 GENDER DYSPHORIA: ICD-10-CM

## 2024-06-11 PROCEDURE — 99024 POSTOP FOLLOW-UP VISIT: CPT | Performed by: PHYSICIAN ASSISTANT

## 2024-06-11 RX ORDER — RIZATRIPTAN BENZOATE 5 MG/1
TABLET, ORALLY DISINTEGRATING ORAL
COMMUNITY
Start: 2024-05-29

## 2024-06-11 ASSESSMENT — PAIN SCALES - GENERAL: PAINLEVEL: 0-NO PAIN

## 2024-06-11 NOTE — ASSESSMENT & PLAN NOTE
Discussed getting scheduled with PFPT. As well as continuing with daily dilations. Asked that she apply medihoney to dilator when dilating. Told her I will discuss further with Dr. Chowdary once she returns to see if she has any further recommendations. Follow-up in two weeks or sooner if needed.

## 2024-06-11 NOTE — PROGRESS NOTES
Plastic Surgery Clinic Note    Subjective: Vera is here today to be seen in follow-up. Difficulty with dilations, says it feels tight. Able to get blue in, but not to full depth. Able to fully insert purple. Usually can fully insert blue and at times green. Voiding mostly straight, no spraying.    Objective: On focused examination, wound dehiscence is much improved. Very small area on the superior aspect of the left side of the urethra, right side is almost completely healed. On digital examination able to easily and fully insert index finger. Some pain with palpation of the levator on the left. A Chaperone was present for the entirety of the examination.     Assessment/Plan:   Gender dysphoria  Discussed getting scheduled with PFPT. As well as continuing with daily dilations. Asked that she apply medihoney to dilator when dilating. Told her I will discuss further with Dr. Chowdary once she returns to see if she has any further recommendations. Follow-up in two weeks or sooner if needed.         BRIAN Dan C

## 2024-06-27 ENCOUNTER — OFFICE VISIT (OUTPATIENT)
Dept: PLASTIC SURGERY | Facility: CLINIC | Age: 29
End: 2024-06-27
Payer: COMMERCIAL

## 2024-06-27 VITALS — DIASTOLIC BLOOD PRESSURE: 76 MMHG | OXYGEN SATURATION: 98 % | SYSTOLIC BLOOD PRESSURE: 114 MMHG | HEART RATE: 61 BPM

## 2024-06-27 DIAGNOSIS — Q64.9 URINARY ANOMALY: ICD-10-CM

## 2024-06-27 DIAGNOSIS — M62.89 PELVIC FLOOR DYSFUNCTION: ICD-10-CM

## 2024-06-27 DIAGNOSIS — F64.9 GENDER DYSPHORIA: Primary | ICD-10-CM

## 2024-06-27 PROCEDURE — 99024 POSTOP FOLLOW-UP VISIT: CPT | Performed by: PLASTIC SURGERY

## 2024-06-27 RX ORDER — TRIAMCINOLONE ACETONIDE 1 MG/G
1 OINTMENT TOPICAL 2 TIMES DAILY
Qty: 30 G | Refills: 1 | Status: SHIPPED | OUTPATIENT
Start: 2024-06-27 | End: 2024-07-27

## 2024-06-27 NOTE — PROGRESS NOTES
Plastic and Reconstructive Surgery Clinic Note    Vera is s/p urethroplasty. She reports she is doing well. She is urinating with a straight stream and does not have as much deviation and previously. Working on scheduling pelvic floor therapy - has been utilizing previously learned strategies with some benefit.     On focused examination, the urethra is midline. Urethra almost fully healed. Some granulation tissue present. On digital examination able to easily and fully insert index finger.  A Chaperone was present for the entirety of the examination.       Healing appropriately. Follow up in 4-6 weeks. Can return to work without limitation.       Shakila Chowdary MD

## 2024-07-31 NOTE — PROGRESS NOTES
Plastic Surgery Clinic Note    Subjective: Vera is 2.5 months s/p urethroplasty. Back to using her usual dilator. Has not met with PFPT. Happy with her results. Urinating straight.    Objective: On focused examination urethra is midline. Granulation tissue present inferiorly on the left, this was touched with silver nitrate. No signs of infection. Chaperone was present for the entirety of the examination.     Assessment/Plan:   Gender dysphoria  Vera is doing great. No restrictions. Follow-up in one month with Dr. Chowdary.         BRIAN Dan C

## 2024-08-01 ENCOUNTER — OFFICE VISIT (OUTPATIENT)
Dept: PLASTIC SURGERY | Facility: CLINIC | Age: 29
End: 2024-08-01
Payer: COMMERCIAL

## 2024-08-01 VITALS — DIASTOLIC BLOOD PRESSURE: 78 MMHG | SYSTOLIC BLOOD PRESSURE: 112 MMHG

## 2024-08-01 DIAGNOSIS — F64.9 GENDER DYSPHORIA: Primary | ICD-10-CM

## 2024-08-01 PROCEDURE — 99024 POSTOP FOLLOW-UP VISIT: CPT | Performed by: PHYSICIAN ASSISTANT

## 2024-08-01 RX ORDER — PROGESTERONE 200 MG/1
CAPSULE ORAL
COMMUNITY
Start: 2024-07-24

## (undated) DEVICE — CATH PLUG

## (undated) DEVICE — NEEDLE DISP HYPO 22GX1IN

## (undated) DEVICE — DRAPE UNDERBUTTOCK GRAD POUCH PORT 20/CS

## (undated) DEVICE — SUTURE CHROMIC 3-0 GUT UNDYED 1X30 V-20

## (undated) DEVICE — GLOVE SZ 6.5 LINER PROTEXIS PI BL

## (undated) DEVICE — TUBING SMOKE EVAC PENCIL COATED

## (undated) DEVICE — LEGGINGS 33X49 XL PAIR ST CLR 20/CS

## (undated) DEVICE — GOWN SIRUS FABRNF RAGLAN L ST 30/CS

## (undated) DEVICE — BLADE SCALPEL #15

## (undated) DEVICE — DRAPE EENT SPLIT ST 8/CS

## (undated) DEVICE — BLADE SCALPEL #10

## (undated) DEVICE — SPONGE LAP 18X18 SAFE-T RFID ENHANCED XRAY

## (undated) DEVICE — DRESSING TEGADERM 4X4 3/4

## (undated) DEVICE — BETADINE SOLUTION 8OZ BT

## (undated) DEVICE — SURGICEL 2X14

## (undated) DEVICE — YELLOW LONE STAR STAY

## (undated) DEVICE — SYRINGE DISP LUER-LOK 10 CC

## (undated) DEVICE — SYRINGE 10CC CONTROL LL

## (undated) DEVICE — SUTURE POLYSORB 3-0 UNDYED 1X30 V-20

## (undated) DEVICE — TIP BOVIE BLADE COATED INSULATED 2.75IN

## (undated) DEVICE — STAPLER SKIN

## (undated) DEVICE — UNDERPAD INCONTINENCE 30 X 36 BLUE HEAVY ABSORB DISPOSABLE

## (undated) DEVICE — TOOMEY SYRINGE, 70CC STERILE

## (undated) DEVICE — TRAY CATH FOLEY W/URINE METER 16F

## (undated) DEVICE — TUBE SUCTION 1/4INX20FT STERILE

## (undated) DEVICE — SUTURE MONOSOF 2-0 BLACK 1X30 C-15

## (undated) DEVICE — MARKER SURGICAL SKIN

## (undated) DEVICE — GLOVE SZ 6.5 PROTEXIS PI

## (undated) DEVICE — PAD GROUND ELECTROSURGICAL W/CORD

## (undated) DEVICE — TIP SUCTION FRAZIER 12FR